# Patient Record
Sex: FEMALE | Race: WHITE
[De-identification: names, ages, dates, MRNs, and addresses within clinical notes are randomized per-mention and may not be internally consistent; named-entity substitution may affect disease eponyms.]

---

## 2019-12-03 ENCOUNTER — HOSPITAL ENCOUNTER (OUTPATIENT)
Dept: HOSPITAL 75 - PREOP | Age: 44
End: 2019-12-03
Attending: PODIATRIST
Payer: COMMERCIAL

## 2019-12-03 VITALS — BODY MASS INDEX: 44.81 KG/M2 | WEIGHT: 285.5 LBS | HEIGHT: 67.01 IN

## 2019-12-03 DIAGNOSIS — Z01.818: Primary | ICD-10-CM

## 2019-12-09 ENCOUNTER — HOSPITAL ENCOUNTER (OUTPATIENT)
Dept: HOSPITAL 75 - SDC | Age: 44
Discharge: HOME | End: 2019-12-09
Attending: PODIATRIST
Payer: COMMERCIAL

## 2019-12-09 VITALS — DIASTOLIC BLOOD PRESSURE: 90 MMHG | SYSTOLIC BLOOD PRESSURE: 133 MMHG

## 2019-12-09 VITALS — SYSTOLIC BLOOD PRESSURE: 140 MMHG | DIASTOLIC BLOOD PRESSURE: 91 MMHG

## 2019-12-09 VITALS — WEIGHT: 285.5 LBS | BODY MASS INDEX: 44.81 KG/M2 | HEIGHT: 66.93 IN

## 2019-12-09 VITALS — SYSTOLIC BLOOD PRESSURE: 121 MMHG | DIASTOLIC BLOOD PRESSURE: 80 MMHG

## 2019-12-09 VITALS — DIASTOLIC BLOOD PRESSURE: 95 MMHG | SYSTOLIC BLOOD PRESSURE: 138 MMHG

## 2019-12-09 VITALS — DIASTOLIC BLOOD PRESSURE: 92 MMHG | SYSTOLIC BLOOD PRESSURE: 137 MMHG

## 2019-12-09 VITALS — DIASTOLIC BLOOD PRESSURE: 83 MMHG | SYSTOLIC BLOOD PRESSURE: 119 MMHG

## 2019-12-09 VITALS — SYSTOLIC BLOOD PRESSURE: 141 MMHG | DIASTOLIC BLOOD PRESSURE: 96 MMHG

## 2019-12-09 VITALS — DIASTOLIC BLOOD PRESSURE: 72 MMHG | SYSTOLIC BLOOD PRESSURE: 113 MMHG

## 2019-12-09 VITALS — SYSTOLIC BLOOD PRESSURE: 142 MMHG | DIASTOLIC BLOOD PRESSURE: 90 MMHG

## 2019-12-09 DIAGNOSIS — Z90.710: ICD-10-CM

## 2019-12-09 DIAGNOSIS — E66.01: ICD-10-CM

## 2019-12-09 DIAGNOSIS — Z91.018: ICD-10-CM

## 2019-12-09 DIAGNOSIS — Z90.49: ICD-10-CM

## 2019-12-09 DIAGNOSIS — M20.12: Primary | ICD-10-CM

## 2019-12-09 DIAGNOSIS — T84.84XA: ICD-10-CM

## 2019-12-09 PROCEDURE — 87081 CULTURE SCREEN ONLY: CPT

## 2019-12-09 RX ADMIN — SODIUM CHLORIDE, SODIUM LACTATE, POTASSIUM CHLORIDE, AND CALCIUM CHLORIDE PRN MLS/HR: 600; 310; 30; 20 INJECTION, SOLUTION INTRAVENOUS at 07:06

## 2019-12-09 RX ADMIN — SODIUM CHLORIDE, SODIUM LACTATE, POTASSIUM CHLORIDE, AND CALCIUM CHLORIDE PRN MLS/HR: 600; 310; 30; 20 INJECTION, SOLUTION INTRAVENOUS at 10:09

## 2019-12-09 NOTE — PROGRESS NOTE-PRE OPERATIVE
Pre-Operative Progress Note


H&P Reviewed


The H&P was reviewed, patient examined and no changes noted.


Date Seen by Provider:  Dec 9, 2019


Time Seen by Provider:  07:23


Date H&P Reviewed:  Dec 9, 2019


Time H&P Reviewed:  07:23


Pre-Operative Diagnosis:  Hallux Valgus, left











AGUSTO DUFFY DPM               Dec 9, 2019 07:23


POS

## 2019-12-09 NOTE — OPERATIVE REPORT
DATE OF SERVICE:  12/09/2019



SURGEON:

Angelic Ness DPM.



PREOPERATIVE DIAGNOSES:

1.  Hallux abductovalgus metatarsus primus varus.

2.  Failed hardware.



POSTOPERATIVE DIAGNOSES:

1.  Hallux abductovalgus metatarsus primus varus.

2.  Failed hardware, left foot.



PROCEDURE:

1.  Modified Lapidus Akin bunionectomy, left.

2.  Removal of failed hardware, left.



WOUND CLASS:

Clean.



ANESTHESIA:

General.



HEMOSTASIS:

Pneumatic thigh tourniquet at 250 mmHg.



INDICATIONS:

This 44-year-old female presents complaining of painful left foot and bunion

deformity.  She has had a previous Lapidus bunionectomy.  The screw fixation

apparently has backed out and is causing some discomfort for the patient.  Also,

the great toe is drifting laterally and is overlapping and abutting the second

digit.  This is affecting her ambulation in comfortably wearing shoes.  She is

willing to proceed after risks and complications were discussed at length.  No

guarantees were extended to the patient and she is willing to proceed.



DESCRIPTION OF PROCEDURE:

The patient was brought back to the operating table, placed in secure supine

position.  General anesthetic was induced.  Appropriate timeout was performed. 

A pneumatic thigh tourniquet was placed on the left lower extremity over several

layers of padding.  Utilizing aseptic technique, 10 mL of 0.5% Marcaine was

injected in a Abdi block to the left foot.  The left foot was then prepped and

draped in normal sterile manner.  The left foot was then elevated, allowed to

exsanguinate after which the tourniquet was inflated to 250 mmHg.



Attention was then directed to the dorsal aspect of the left foot where along

the previous incision, a similar incision was created, which is approximately 10

cm in length.  The incision extended from the medial cuneiform to the

metatarsophalangeal joint and proximal phalanx area of the left first

metatarsophalangeal joint.  Dissection was carried out utilizing sharp and blunt

dissection.  The incision was deepened down to the capsular tissue of the first

metatarsophalangeal joint as well as to the medial aspect of the extensor

hallucis longus in the area of the hardware associated with the first metatarsal

medial cuneiform fusion area.  The incision was deepened down to the plate and

screws, which were removed.  The interfrag screw was very prominent and some

inflammatory tissue was noted in the area overlying this screw.  This is also in

the area of the patient's described discomfort.  The hardware was removed

completely.  Next, due to the fact that the first metatarsal remained in a

valgus orientation, a new Lapidus was performed and because it was a short first

metatarsal, it is advisable to utilize an allograft spacer.  First, a power

sagittal saw was utilized to create an opening at the first metatarsal medial

cuneiform joint area.  A second cut was performed to create more lateral 
deviation

and plantar flexion of the distal first metatarsal.  Utilizing a [place] 28 
Lapidus

system and allograft, an excellent reduction of the hallux valgus deformity was

appreciated.  First, there was a size 5 allograft Lapidus graft was placed and

confirmed by C-arm that it was appropriate reduction of the patient's deformity.

 Next, a standard 4-hole left Lapidus plate was utilized with a most proximal

screw of 3.5 mm diameter and 26 mm of length with locking screw placed.  Next,

an interfrag screw was applied.  It was a 3.5 mm, 38 mm in length from distal

dorsal to proximal plantar.  Good compression across the allograft and the

arthrodesis site was appreciated.  Next, a 3.5 locking screw of 16 mm of length

was applied to the remaining proximal hole of the locking of the plate.  The two

distal screws were 3.5 mm screws of 20 mm in length and 16 mm of length

respectively.  The last screw was a nonlocking screw.  Again, C-arm was utilized

to confirm appropriate alignment and there is a good reduction of the

dorsiflexion and valgus rotation that was noted preoperatively.  There was

improved sesamoid position as well as alignment of the first metatarsophalangeal

joint.



There was a good reduction of the lateral deviation of the hallux, but it was

then decided that an Nhan type procedure would assist in further improvement of

the alignment of the hallux.  Blunt dissection was carried out into the first

intermetatarsal space where a lateral release was performed.  A lateral

capsulorrhaphy was performed as well as release of the conjoint tendon of the

adductor hallucis and the fibular sesamoidal ligament.  Next, attention was

taken to remove the 28-gauge monofilament wire from the proximal phalanx.  Once

this was done, a revisional Nhan procedure was performed.  A sagittal saw was

utilized to create the osteotomy with the lateral cortices intact and the base

of the osteotomy was medial.  Once the gap was closed, this reduced the hallux

valgus deformity further.  Two  holes were created at the dorsal medial

aspect of the osteotomy after which a 28-gauge monofilament wire was then passed

through this  hole securing the osteotomy in a closed position.  Excellent

bony apposition and fixation was appreciated at this time.  The whole wound was

flushed with copious amounts of normal saline throughout the procedure.



Closure was then performed in layers.  Deep closure was performed with 3-0

Vicryl, superficial with 4-0 Vicryl, skin was closed with 4-0 Prolene in a

horizontal mattress type stitch.  Postoperative injection consisted of 12 mL of

0.5% Marcaine plain injected in a local infusion to the surgical site. 

Postoperative injection also included 10 mg dexamethasone injected into the

first intermetatarsal space, left foot.



Postoperative dressing consisted of Betadine soaked Adaptic, sterile 4 x 4,

sterile Kerlix all secured with a Coban wrap.



The patient tolerated the anesthesia and procedure well, was transported from

the operating room to the recovery area with vital signs stable and vascular

status intact to all digits of the left foot.  She was given a prescription for

Keflex and Vicodin.  She is to follow up in my office in 10 days' period of time

or sooner if necessary.





Job ID: [id number]

DocumentID: [id number]

Dictated Date:  12/09/2019 10:47:21

Transcription Date: 12/09/2019 18:25:18

Dictated By: WENDY ALONSO

## 2019-12-09 NOTE — ANESTHESIA-GENERAL POST-OP
General


Patient Condition


Mental Status/LOC:  Same as Preop


Cardiovascular:  Satisfactory


Nausea/Vomiting:  Absent


Respiratory:  Satisfactory


Pain:  Controlled


Complications:  Absent





Post Op Complications


Complications


None





Follow Up Care/Instructions


Patient Instructions


None needed.





Anesthesia/Patient Condition


Patient Condition


Patient is doing well, no complaints, stable vital signs, no apparent adverse 

anesthesia problems.   


No complications reported per nursing.











JACY CURTIS CRNA               Dec 9, 2019 13:17


POS

## 2019-12-09 NOTE — PROGRESS NOTE-POST OPERATIVE
Post-Operative Progess Note


Surgeon (s)/Assistant (s)


Surgeon


AGUSTO DUFFY DPM


Assistant:  none





Pre-Operative Diagnosis


Hallux Valgus, left





Post-Operative Diagnosis





same, plus failed hardware





Procedure & Operative Findings


Date of Procedure


12/9/19


Procedure Performed/Findings


Lapidus-akin bunionectomy, Removal of hardware, left foot


Anesthesia Type


General





Estimated Blood Loss


Estimated blood loss (mL):  minimal





Specimens/Packing


Specimens Removed


hardware from previous bunionectomy











AGUSTO DUFFY DPM               Dec 9, 2019 10:30


POS

## 2019-12-09 NOTE — PHYSICAL THERAPY PROGRESS NOTE
Therapy Progress Note


Patient declined PT secondary to multiple foot surgeries prior and has 

established knee scooter and other AD for home use.  Thank you for this 

referral.


1 ref ([id number])











BRENDAN ADHIKARI PT               Dec 9, 2019 12:18


POS

## 2019-12-09 NOTE — DIAGNOSTIC IMAGING REPORT
INDICATION: Fluoroscopy utilized in surgery by Dr. Ness for

hardware removal.



FINDINGS: No previous films for review. There are holes in the

1st metatarsal shaft consistent with previous hardware. There is

sideplate present with bone screws along the 1st metatarsal

tarsal joint which appear intact.



IMPRESSION: Intraoperative film showing arthrodesis of the 1st MP

joint. Hardware present as described.



Dictated by: 



  Dictated on workstation # [id number]

## 2020-01-03 NOTE — XMS REPORT
MU2 Ambulatory Summary

                             Created on: 2019



Elba Bradley

External Reference #: [id number]

: 1975

Sex: Female



Demographics





                          Address                   [address]

[place]  [address]

 

                          Home Phone                [phone]

 

                          Preferred Language        English

 

                          Marital Status            

 

                          Orthodox Affiliation     Unknown

 

                          Race                      Other Race

 

                          Ethnic Group              Not  or 





Author





                          Author                    Elba Walls

 

                          [facility] 

[facility]

 

                          Address                   [address]

[place], [place]  [id number]



 

                          Phone                     [phone]







Care Team Providers





                    Care Team Member Name Role                Phone

 

                    Moisés Walls         PCP                 [phone]

 

                    Amor Harper PreferredProvider   [phone]







Allergies and Adverse Reactions





                    Name                Reaction            Notes

 

                      NO KNOWN DRUG ALLERGIES                      







Plan of Treatment





             Planned Activity Comments     Planned Date Planned Time Plan/Goal

 

             MRI CERVICAL SPINE W/O CONTRAST              2019    12:00 AM 

     

 

             Lumbar Spine 2-3 Views - Main              2019    12:00 AM   

   







Medications





                                        Active 

 

             Name         Start Date   Estimated Completion Date SIG          Co

mments

 

             Mucinex D  mg oral tablet extended release 12 hr             

                            

 

             Advil Cold and Sinus oral                                         

 

             albuterol sulfate inhalation                                       

  

 

                ipratropium bromide 0.02 % inhalation solution 2019         

               inhale 1.25 

milliliters (250 mcg) via nebulizer by inhalation route 3 times per day  

 

                diclofenac sodium 75 mg oral tablet,delayed release (DR/EC) 4/10

/2019       5/10/2019       

take 1 tablet (75 mg) by oral route 2 times per day for 30 days  







                                         

 

             Name         Start Date   Expiration Date SIG          Comments

 

                Mirena Intrauterine IUD 20 mcg/24 hour (5 years)                

                 place 1 device by 

intrauterine route                       

 

             doxycycline hyclate Oral tablet 20 mg                           [place]

e 1 tablet by oral route daily  

 

                levofloxacin 500 mg oral tablet 2019       

take 1 tablet (500 mg) by 

oral route once daily for 10 days        







                                        Discontinued 

 

             Name         Start Date   Discontinued Date SIG          Comments

 

                doxycycline hyclate Oral capsule 100 mg                 

3       take 1 capsule (100 mg) by 

oral route once daily                    







Problem List

Not available.



Vital Signs





     Date Time BP-Sys(mm[Hg] BP-Tami(mm[Hg]) HR(bpm) RR(rpm) Temp WT   HT   HC   

BMI  BSA  BMI

 Percentile                             O2 Sat(%)

 

       2019 6:28:00  mmHg 102 mmHg 101 bpm 24 rpm 98.2 F 181.5 lbs 67.

25 in  

                28.2157 kg/m  1.9764 m                      97 %

 

       2019 4:41:00  mmHg 72 mmHg 68 bpm 16 rpm 97.7 F 188.125 lbs 67.

25 in  

                29.25 kg/m2     2.01 m2                         98 %

 

      2013 8:46:00  mmHg 92 mmHg 72 bpm       97.6 F 263.25 lbs 67 in

       41.2304

 kg/m             2.3759 m                               

 

      2013 9:49:00  mmHg 98 mmHg 90 bpm       97 F  266.25 lbs 67 in 

      41.70 

kg/m2               2.39 m2                                  

 

      2012 9:16:00  mmHg 90 mmHg 66 bpm       97.2 F 264 lbs 67 in  

     41.3478 

kg/m              2.3792 m                               







Social History





                    Name                Description         Comments

 

                    Tobacco             Never smoker         

 

                    Alcohol             Never                







History of Procedures





                    Date Ordered        Description         Order Status

 

                    2012 12:00 AM CYTOPATH C/V MANUAL Reviewed

 

                    2012 12:00 AM SPECIMEN HANDLING OFFICE-LAB Reviewed

 

                    2012 12:00 AM CHLAMYDIA CULTURE   Reviewed

 

                    2012 12:00 AM N.GONORRHOEAE DNA AMP PROB Reviewed

 

                    2013 12:00 AM  URINE PREGNANCY TEST Reviewed

 

                    2013 12:00 AM  INSERT INTRAUTERINE DEVICE Reviewed

 

                    2013 12:00 AM  Mirena              Reviewed

 

                    2019 12:00 AM   FLU VAC NO PRSV 4 TAWANDA 3 YRS+ Reviewed

 

                    2019 12:00 AM   THER/PROPH/DIAG INJ SC/IM Reviewed

 

                    2019 12:00 AM   Decadron 8mg Injection Reviewed

 

                    2019 12:00 AM   Depo-Medrol 80mg Injection Reviewed

 

                    2019 12:00 AM   THER/PROPH/DIAG INJ SC/IM Reviewed







Results Summary

Not available.



History Of Immunizations





      Name  Date Admin Mfg Name Mfg Code Trade Name Lot#  Route Inj   Vis Given 

Vis Pub 

CVX

 

        Influenza 10/4/2017 Not Entered NE      Not Entered         Not Entered 

Not Entered 

1                  158

 

          Influenza 2019  [employer] [place] or [place] BCQ       Flulaval qu

adrivalent 3PM59     

Intramuscular   Right Deltoid   2019        158







History of Past Illness





                    Name                Date of Onset       Comments

 

                    Rosacea                                  

 

                    Chicken pox                              

 

                    Allergic rhinitis                        

 

                    Bunion of left foot                      

 

                    Bunion of right foot                      

 

                    Plantar fasciitis                        

 

                    Asthma                                   

 

                    Reactive hypoglycemia                      

 

                    Routine gynecological examination Dec 19 2012  9:18AM  

 

                                        Special investigations and examinations;

 pregnancy examination or test; 

pregnancy examination or test, negative result 2013 10:00AM        

 

                    IUD insertion       2013  9:52AM  

 

                    IUD Check/Removal/Management/Reinsertion Aug 28 2013  8:54AM

  

 

                    Flu vaccine need    2019  4:56PM  

 

                    Neck pain           2019  4:56PM  

 

                    Back pain           2019  4:56PM  

 

                    Knee pain           2019  4:56PM  

 

                    Bunion of great toe of left foot 2019  4:56PM  

 

                    Vitamin D deficiency 2019  4:56PM  

 

                    History of bariatric surgery 2019  4:56PM  

 

                    Bronchitis, Acute   2019  6:33PM  

 

                    Sinusitis, Acute    2019  6:33PM  

 

                    Cervicalgia         2019 12:25PM  

 

                    Other chronic pain  2019 12:25PM  

 

                    Dorsalgia, unspecified 2019 12:25PM  

 

                    Other chronic pain  2019 12:25PM  







Payers





           Insurance Name Company Name Plan Name  Plan Number Policy Number Slim

cy Group 

Number                                  Start Date

 

                    [employer]      [employer] [facility]           [id number]           N/

A

 

                    [employer]      [employer] [facility]           [id number]           N/

A







History of Encounters





                    Visit Date          Visit Type          Provider

 

                    2019            Office visit        Moisés Walls NP

 

                    2019            Office visit        Amor Harper 

APRN

 

                    2013           Office visit        Pritesh Shoemaker MD

 

                    2013           Office visit        Pritesh Shoemaker MD

 

                    2012          Office visit        Pritesh Shoemaker MD

## 2020-01-03 NOTE — XMS REPORT
MU2 Ambulatory Summary

                             Created on: 2019



Elba Bradley

External Reference #: [id number]

: 1975

Sex: Female



Demographics





                          Address                   [address]

[place]  [address]

 

                          Home Phone                [phone]

 

                          Preferred Language        English

 

                          Marital Status            

 

                          Religion Affiliation     Unknown

 

                          Race                      Other Race

 

                          Ethnic Group              Not  or 





Author





                          Elba Malloy

 

                          [facility] 

[facility]

 

                          Address                   [address]

[place], [place]  [address]



 

                          Phone                     [phone]







Care Team Providers





                    Care Team Member Name Role                Phone

 

                    Brenda Lora PCP                 [phone]

 

                    Amor Harper PreferredProvider   [phone]







Allergies and Adverse Reactions





                    Name                Reaction            Notes

 

                      NO KNOWN DRUG ALLERGIES                      







Plan of Treatment





             Planned Activity Comments     Planned Date Planned Time Plan/Goal

 

             Breast ultrasound              2019     12:00 AM      

 

             Unilateral Diagnostic Mammo with Tomosynthesis              

9     12:00 AM      







Medications





                                        Active 

 

             Name         Start Date   Estimated Completion Date SIG          Co

mments

 

             Mucinex D  mg oral tablet extended release 12 hr             

                            

 

             Advil Cold and Sinus oral                                         

 

             albuterol sulfate inhalation                                       

  

 

                ipratropium bromide 0.02 % inhalation solution 2019         

               inhale 1.25 

milliliters (250 mcg) via nebulizer by inhalation route 3 times per day  

 

                cholecalciferol (vitamin D3) 50,000 unit oral capsule 5/10/2019 

                      take 1 capsule

by oral route once  weekly               







                                         

 

             Name         Start Date   Expiration Date SIG          Comments

 

                Mirena Intrauterine IUD 20 mcg/24 hour (5 years)                

                 place 1 device by 

intrauterine route                       

 

             doxycycline hyclate Oral tablet 20 mg                           kayleigh

e 1 tablet by oral route daily  

 

                levofloxacin 500 mg oral tablet 2019       

take 1 tablet (500 mg) by 

oral route once daily for 10 days        

 

                diclofenac sodium 75 mg oral tablet,delayed release (DR/EC) 4/10

/2019       5/10/2019       

take 1 tablet (75 mg) by oral route 2 times per day for 30 days  







                                        Discontinued 

 

             Name         Start Date   Discontinued Date SIG          Comments

 

                doxycycline hyclate Oral capsule 100 mg                 

3       take 1 capsule (100 mg) by 

oral route once daily                    







Problem List

Not available.



Vital Signs





     Date Time BP-Sys(mm[Hg] BP-Tami(mm[Hg]) HR(bpm) RR(rpm) Temp WT   HT   HC   

BMI  BSA  BMI

 Percentile                             O2 Sat(%)

 

      2019 9:27:00  mmHg 80 mmHg 65 bpm       98.4 F 187 lbs 67.25 in

       29.0707

 kg/m             2.0062 m                               

 

       2019 6:28:00  mmHg 102 mmHg 101 bpm 24 rpm 98.2 F 181.5 lbs 67.

25 in  

                28.22 kg/m2     1.98 m2                         97 %

 

       2019 4:41:00  mmHg 72 mmHg 68 bpm 16 rpm 97.7 F 188.125 lbs 67.

25 in  

                29.25 kg/m2     2.01 m2                         98 %

 

      2013 8:46:00  mmHg 92 mmHg 72 bpm       97.6 F 263.25 lbs 67 in

       41.2304

 kg/m             2.3759 m                               

 

      2013 9:49:00  mmHg 98 mmHg 90 bpm       97 F  266.25 lbs 67 in 

      41.70 

kg/m2               2.39 m2                                  

 

      2012 9:16:00  mmHg 90 mmHg 66 bpm       97.2 F 264 lbs 67 in  

     41.3478 

kg/m              2.3792 m                               







Social History





                    Name                Description         Comments

 

                    Tobacco             Never smoker         

 

                    Alcohol             Never                







History of Procedures





                    Date Ordered        Description         Order Status

 

                    2012 12:00 AM CYTOPATH C/V MANUAL Reviewed

 

                    2012 12:00 AM SPECIMEN HANDLING OFFICE-LAB Reviewed

 

                    2012 12:00 AM CHLAMYDIA CULTURE   Reviewed

 

                    2012 12:00 AM N.GONORRHOEAE DNA AMP PROB Reviewed

 

                    2013 12:00 AM  URINE PREGNANCY TEST Reviewed

 

                    2013 12:00 AM  INSERT INTRAUTERINE DEVICE Reviewed

 

                    2013 12:00 AM  Mirena              Reviewed

 

                    2019 12:00 AM   FLU VAC NO PRSV 4 TAWANDA 3 YRS+ Reviewed

 

                    2019 12:00 AM   THER/PROPH/DIAG INJ SC/IM Reviewed

 

                    2019 12:00 AM   Decadron 8mg Injection Reviewed

 

                    2019 12:00 AM   Depo-Medrol 80mg Injection Reviewed

 

                    2019 12:00 AM   THER/PROPH/DIAG INJ SC/IM Reviewed

 

                    2019 12:00 AM  MRI NECK SPINE W/O DYE Reviewed

 

                    2019 12:00 AM  X-RAY EXAM L-S SPINE 2/3 VWS Reviewed

 

                    2019 12:00 AM   ROUTINE VENIPUNCTURE Reviewed

 

                    2019 12:00 AM   COMPREHEN METABOLIC PANEL Reviewed

 

                    2019 12:00 AM   GLYCOSYLATED HEMOGLOBIN TEST Reviewed

 

                    2019 12:00 AM   VITAMIN D 25 HYDROXY Reviewed

 

                    2019 12:00 AM   ASSAY THYROID STIM HORMONE Reviewed

 

                    2019 12:00 AM   LIPID PANEL         Reviewed

 

                    2019 12:00 AM  Screening mammography, bilateral Reviewe

d

 

                    2019 12:00 AM  MAMMOGRAM BOTH BREASTS Reviewed







Results Summary





                          Date and Description      Results

 

                          2019 8:25 AM          GLUCOSE 86 SODIUM 140 POTASS

IUM 4.3 CHLORIDE 108 CO2 26 BUN 13 

CREATININE 0.73 SGOT/AST 15 SGPT/ALT 8 ALK PHOS 52 TOTAL PROTEIN 6.1 ALBUMIN 4.0
 TOTAL BILI 0.8 CALCIUM 9.4 AGE 43 GFR NonAA 86 GFR  eGFR 86 eGFR AA* >60 
TRIGLYCERIDES 102 CHOLESTEROL 204 HDL 54 TOT CHOL/HDL 3.8 LDL (CALC) 130 VITAMIN
 D 17.7 TSH 0.98 HGB A1C 5.10 %Est Avg Glucose 99.7 







History Of Immunizations





      Name  Date Admin Mfg Name Mfg Code Trade Name Lot#  Route Inj   Vis Given 

Vis Pub 

CVX

 

        Influenza 10/4/2017 Not Entered NE      Not Entered         Not Entered 

Not Entered 

1                  158

 

          Influenza 2019  ID [place] or [place] BCQ       Flulaval qu

adrivalent 3PM59     

Intramuscular   Right Deltoid   2019        158







History of Past Illness





                    Name                Date of Onset       Comments

 

                    Rosacea                                  

 

                    Chicken pox                              

 

                    Allergic rhinitis                        

 

                    Bunion of left foot                      

 

                    Bunion of right foot                      

 

                    Plantar fasciitis                        

 

                    Asthma                                   

 

                    Reactive hypoglycemia                      

 

                    Routine gynecological examination Dec 19 2012  9:18AM  

 

                                        Special investigations and examinations;

 pregnancy examination or test; 

pregnancy examination or test, negative result 2013 10:00AM        

 

                    IUD insertion       2013  9:52AM  

 

                    IUD Check/Removal/Management/Reinsertion Aug 28 2013  8:54AM

  

 

                    Flu vaccine need    2019  4:56PM  

 

                    Neck pain           2019  4:56PM  

 

                    Back pain           2019  4:56PM  

 

                    Knee pain           2019  4:56PM  

 

                    Bunion of great toe of left foot 2019  4:56PM  

 

                    Vitamin D deficiency 2019  4:56PM  

 

                    History of bariatric surgery 2019  4:56PM  

 

                    Bronchitis, Acute   2019  6:33PM  

 

                    Sinusitis, Acute    2019  6:33PM  

 

                    Cervicalgia         2019 12:25PM  

 

                    Other chronic pain  2019 12:25PM  

 

                    Dorsalgia, unspecified 2019 12:25PM  

 

                    Other chronic pain  2019 12:25PM  

 

                    Screening for ischemic heart disease May  8 2019 10:52AM  

 

                    Myalgia             May  8 2019 10:52AM  

 

                    Reactive hypoglycemia May  8 2019 10:52AM  

 

                    History of bariatric surgery May  8 2019 10:52AM  

 

                    Encounter for screening mammogram for breast cancer May 13 2

019  1:28PM  

 

                    Encntr for gyn exam (general) (routine) w/o abn findings May

 13 2019  9:33AM  

 

                          Special screening for malignant neoplasms; breast; oth

er screening mammogram May

 13 2019  9:33AM                         

 

                    Abnormal Mammogram  2019  4:15PM  







Payers





           Insurance Name Company Name Plan Name  Plan Number Policy Number Slim

cy Group 

Number                                  Start Date

 

                    [employer]      [employer] [place]           [id number]           N/

A

 

                    [employer]      [employer]           [id number]           N/

A







History of Encounters





                    Visit Date          Visit Type          Provider

 

                    2019           Office visit         

 

                    2019           Office visit        Brenda melendrez APRN

 

                    2019            Laboratory          Amor Harper 

APRN

 

                    2019            Office visit        Moisés Walls NP

 

                    2019            Office visit        Amor Harper 

APRN

 

                    2013           Office visit        Pritesh Shoemaker MD

 

                    2013           Office visit        Pritesh Shoemaker MD

 

                    2012          Office visit        Pritesh Shoemaker MD

## 2020-01-03 NOTE — XMS REPORT
[facility]

                             Created on: 2018



Elba Bradley

External Reference #: [id number]

: 1975

Sex: Female



Demographics





                          Address                   [address]

[place]  [address]

 

                          Preferred Language        Unknown

 

                          Marital Status            Unknown

 

                          Orthodoxy Affiliation     Unknown

 

                          Race                      Unknown

 

                          Ethnic Group              Unknown





Author





                          Author                    Elba Diego

 

                          Organization              [facility]

 

                          Address                   

[place]  [address]



 

                          Phone                     [phone]







Care Team Providers





                    Care Team Member Name Role                Phone

 

                    Soraida Diego    Unavailable         [phone]







PROBLEMS





          Type      Condition ICD9-CM Code FVU89-QI Code Onset Dates Condition S

tatus SNOMED 

Code

 

          Problem   Conjunctivitis, rosacea 372.31                        Active

    65782688

 

          Problem   Bunion of great toe of left foot 727.1                      

   Active    996184124

 

          Problem   Vitamin D deficiency 268.9                         Active   

 02080721

 

          Problem   Obesity (BMI 30.0-34.9)           E66.9               Active

    [id number]

 

          Problem   General Medical Exam Adult           Z00.00              Act

neva    [id number]

 

          Problem   Gynecological Exam Normal           Z01.419             Acti

ve    [id number]

 

          Problem   Hypertriglyceridemia           E78.1               Active   

 768506847

 

          Problem   Screening For Breast Cancer NOT MAMMOGRAM           Z12.39  

            Active    600418880

 

          Problem   Vitamin D deficiency, unspecified           E55.9           

    Active    37496004

 

          Problem   Morbid obesity with BMI of 45.0-49.9, adult 278.01          

              Active    091306827

 

          Problem   Allergic Rhinitis Unspecified 477.9                         

Active    93923640

 

          Problem   Back Pain, Low 724.2                         Active    72759

9007

 

          Problem   Rosacea   695.3                         Active    095689288

 

          Problem   Hyperhidrosis, Primary Focal 705.21                        A

ctive    144129403

 

          Problem   Hypertriglyceridemia 272.1                         Active   

 968754735







ALLERGIES

No Information



ENCOUNTERS





                Encounter       Location        Date            Diagnosis

 

                          [facility] [address] [facility] [address]  [place] 

[address]                08 May, 2018              Wellness Examination Adult Z

00.00 ; Screening For 

Cervical Cancer Z12.4 ; Vitamin D deficiency, unspecified E55.9 ; Screening For 
Breast Cancer Z12.31 and Status Post Bariatric Surgery Z98.84

 

                          [facility] [address] [facility] [address]  [place] 

[address]                01 May, 2018              Overweight E66.3 and Body ma

ss index (BMI) 27.0-27.9, 

adult Z68.27

 

                          [facility] [address] [facility] [address] 

[facility]  [place] 

[address]                01 May, 2018               

 

                          [facility] [address] [facility] [address]  [place] 

[address]                04 Oct, 2017              Overweight E66.3 ; Vitamin D

 deficiency, unspecified 

E55.9 and Vaccine Flu Z23

 

                          [facility] [address] [facility] [address] 

[facility] [place] 

[address]                29 Sep, 2017              Obesity (BMI 30.0-34.9) E66.

9

 

                          [facility] [address] [facility] [address] 

[facility] [place] 

[address]                              Obesity (BMI 30.0-34.9) E66.

9

 

                          [facility] [address] [facility] [address] 

[facility] [place] 

[address]                10 May, 2017              General Medical Exam Adult Z

00.00 ; Screening For Breast

Cancer NOT MAMMOGRAM Z12.39 ; Vitamin D deficiency, unspecified E55.9 and 
Gynecological Exam Normal Z01.419

 

                          [facility] [address] [facility] [address] 

[facility] [place] 

[address]                14 2016              Exam Gynecological Routine V

72.31 ; Exam General Adult 

Medical V70.0 ; Mammogram, Routine V76.12 ; Hypertriglyceridemia E78.1 ; Exam 
Gynecological Without Abnormal Findings Z01.419 and Exam General Adult Without 
Abnormal Findings Z00.00

 

                          [facility] [address] [facility] [address] 

[facility] [place] 

[address]                               

 

                          [facility] [address] [facility] [address]  [place] 

[address]                28 Aug, 2015              Vitamin D deficiency 268.9

 

                          [facility] [address] [facility] [address] 

[facility] [place] 

[address]                26 Aug, 2015              Vitamin D deficiency 268.9

 

                          [facility] [address] [facility] [address]  [place] 

[address]                              Mammogram, Routine V76.12

 

                          [facility] [address] [facility] [address] 

[facility] [place] 

[address]                10 Miky, 2015              Vitamin D deficiency 268.9

 

                          [facility] [address] 

[address]                08 2015              Hypertriglyceridemia 272.1 a

nd Nondisplaced fracture of 

first metatarsal bone with delayed healing V54.19

 

                          [facility] [address] 

[address]                05 2015              Ocular rosacea 695.3 and Non

displaced fracture of first 

metatarsal bone with delayed healing V54.19

 

                          [facility] [address] 

[address]                26 May, 2015               

 

                          [facility] [address] 

[address]                23 Mar, 2015              Lip lesion 528.5

 

                          [facility] [address] 

[address]                23 Mar, 2015               

 

                          [facility] [address] 

[address]                18 Mar, 2015              Pre-op evaluation V72.84 ; H

ypertriglyceridemia 272.1 ; 

Bunion of great toe of left foot 727.1 and Conjunctivitis, rosacea 372.31

 

                Refills         UNKNOWN         13 Mar, 2015     

 

                          [facility] [address] 

[address]                09 Mar, 2015              Conjunctivitis, rosacea 372.

31

 

                          [facility] [address] 

[address]                              Hypertriglyceridemia 272.1 ;

 Conjunctivitis, rosacea 

372.31 and Rosacea 695.3

 

                          [facility] [address] 

[address]                18 2015              Well woman exam with routine

 gynecological exam V72.31 

and Exam General Adult Medical V70.0

 

                          [facility] [address] 

[address]                              Hyperhidrosis, Primary Focal

 705.21

 

                          [facility] [address] 

[address]                09 Sep, 2014              Hyperhidrosis, Primary Focal

 705.21

 

                          [facility] [address] 

[address]                15 Aug, 2014              Low back pain 724.2 ; SOMAT 

DYSFUNC LUMBAR .3 ; 

SOMAT DYSFUNC SACRAL .4 and SOMAT DYSFUNC THORAC .2

 

                          [facility] [address] 

[address]                              Obesity, morbid: BMI 40 or g

reater 278.01 ; Plantar 

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 
Pressure w/o DX Hypertension 796.2

 

                          [facility] [address] [place] [address] 

[address]                               

 

                          [facility] [address] [place] [address] 

[address]                              Neck Pain 723.1 ; SOMAT DYSF

UNC CERVIC .1 and 

SOMAT DYSFUNC THORAC .2

 

                          [facility] [address] [place] [address] 

[address]                12 May, 2014              Obesity: BMI 30-39.9 278.00

 

                          [facility] [address] [place] [address] 

[address]                              Obesity: BMI 30-39.9 278.00

 

                          [facility] [address] [place] [address] 

[address]                07 Mar, 2014              Obesity: BMI 30-39.9 278.00

 

                          [facility] [address] [place] [address] 

[address]                              Exam General Adult Medical V

70.0 ; Exam Laboratory part 

of Medical Exam V72.62 and Vaccine, DTP or DTaP V06.1

 

                          [facility] [address] [place] [address] 

[address]                              Exam Gynecological Routine V

72.31 ; Exam General Adult 

Medical V70.0 ; Obesity: BMI 30-39.9 278.00 and Mammogram, Routine V76.12

 

                          [facility] [address] [place] [address] 

[address]                15 Jorden, 2014               

 

                          [facility] [address] [facility] [address]  [place] 

[address]                              Backache, Unspecified 724.5 

; SOMAT DYSFUNC THORAC REG 

739.2 and SOMAT DYSFUNC PELVIC .5

 

                          [facility] [address] [facility] [address]  [place] 

[address]                09 Dec, 2013              Back Pain, Low 724.2 and Obe

sity/overweight 278.00







IMMUNIZATIONS

No Known Immunizations



SOCIAL HISTORY

Never Assessed



REASON FOR VISIT

[facility] Demographics



PLAN OF CARE





VITAL SIGNS





MEDICATIONS

No Known Medications



RESULTS

No Results



PROCEDURES

No Known procedures



INSTRUCTIONS





MEDICATIONS ADMINISTERED

No Known Medications



MEDICAL (GENERAL) HISTORY





                    Type                Description         Date

 

                    Surgical History    right knee arthoscopy 

 

                    Surgical History    D&C x2 w/ miscarriages  

 

                    Surgical History    foot surgery   x2 Right foot 2014

 

                    Surgical History    stomach sleeve      2015

 

                    Surgical History    Hystorectomy        2016

 

                    Surgical History    Gallbladder         Aug 2017

 

                    Hospitalization History pre-eclampsia       2005

## 2020-01-03 NOTE — XMS REPORT
MU2 Ambulatory Summary

                             Created on: 2019



Elba Bradley

External Reference #: [id number]

: 1975

Sex: Female



Demographics





                          Address                   [address]

[place]  [address]

 

                          Home Phone                [phone]

 

                          Preferred Language        English

 

                          Marital Status            

 

                          Restoration Affiliation     Unknown

 

                          Race                      Other Race

 

                          Ethnic Group              Not  or 





Author





                          Author                    Elba Harper

 

                          [facility] 

oup

 

                          Address                   [address]

[place], [place]  [id number]



 

                          Phone                     [phone]







Care Team Providers





                    Care Team Member Name Role                Phone

 

                    Amor Harper PCP                 [phone]

 

                    Amor Harper PreferredProvider   [phone]







Allergies and Adverse Reactions





                    Name                Reaction            Notes

 

                      NO KNOWN DRUG ALLERGIES                      







Plan of Treatment





             Planned Activity Comments     Planned Date Planned Time Plan/Goal

 

             MRI CERVICAL SPINE W/O CONTRAST              2019    12:00 AM 

     

 

             CMP                       2019     12:00 AM      

 

             HGB A1C                   2019     12:00 AM      

 

             VITAMIN D (25 HYDROXY)              2019     12:00 AM      

 

             TSH                       2019     12:00 AM      

 

             LIPID PANEL               2019     12:00 AM      







Medications





                                        Active 

 

             Name         Start Date   Estimated Completion Date SIG          Co

mments

 

             Mucinex D  mg oral tablet extended release 12 hr             

                            

 

             Advil Cold and Sinus oral                                         

 

             albuterol sulfate inhalation                                       

  

 

                ipratropium bromide 0.02 % inhalation solution 2019         

               inhale 1.25 

milliliters (250 mcg) via nebulizer by inhalation route 3 times per day  

 

                diclofenac sodium 75 mg oral tablet,delayed release (DR/EC) 4/10

/2019       5/10/2019       

take 1 tablet (75 mg) by oral route 2 times per day for 30 days  







                                         

 

             Name         Start Date   Expiration Date SIG          Comments

 

                Mirena Intrauterine IUD 20 mcg/24 hour (5 years)                

                 place 1 device by 

intrauterine route                       

 

             doxycycline hyclate Oral tablet 20 mg                           [place]

e 1 tablet by oral route daily  

 

                levofloxacin 500 mg oral tablet 2019       

take 1 tablet (500 mg) by 

oral route once daily for 10 days        







                                        Discontinued 

 

             Name         Start Date   Discontinued Date SIG          Comments

 

                doxycycline hyclate Oral capsule 100 mg                 

3       take 1 capsule (100 mg) by 

oral route once daily                    







Problem List

Not available.



Vital Signs





     Date Time BP-Sys(mm[Hg] BP-Tami(mm[Hg]) HR(bpm) RR(rpm) Temp WT   HT   HC   

BMI  BSA  BMI

 Percentile                             O2 Sat(%)

 

       2019 6:28:00  mmHg 102 mmHg 101 bpm 24 rpm 98.2 F 181.5 lbs 67.

25 in  

                28.2157 kg/m  1.9764 m                      97 %

 

       2019 4:41:00  mmHg 72 mmHg 68 bpm 16 rpm 97.7 F 188.125 lbs 67.

25 in  

                29.25 kg/m2     2.01 m2                         98 %

 

      2013 8:46:00  mmHg 92 mmHg 72 bpm       97.6 F 263.25 lbs 67 in

       41.2304

 kg/m             2.3759 m                               

 

      2013 9:49:00  mmHg 98 mmHg 90 bpm       97 F  266.25 lbs 67 in 

      41.70 

kg/m2               2.39 m2                                  

 

      2012 9:16:00  mmHg 90 mmHg 66 bpm       97.2 F 264 lbs 67 in  

     41.3478 

kg/m              2.3792 m                               







Social History





                    Name                Description         Comments

 

                    Tobacco             Never smoker         

 

                    Alcohol             Never                







History of Procedures





                    Date Ordered        Description         Order Status

 

                    2012 12:00 AM CYTOPATH C/V MANUAL Reviewed

 

                    2012 12:00 AM SPECIMEN HANDLING OFFICE-LAB Reviewed

 

                    2012 12:00 AM CHLAMYDIA CULTURE   Reviewed

 

                    2012 12:00 AM N.GONORRHOEAE DNA AMP PROB Reviewed

 

                    2013 12:00 AM  URINE PREGNANCY TEST Reviewed

 

                    2013 12:00 AM  INSERT INTRAUTERINE DEVICE Reviewed

 

                    2013 12:00 AM  Mirena              Reviewed

 

                    2019 12:00 AM   FLU VAC NO PRSV 4 TAWANDA 3 YRS+ Reviewed

 

                    2019 12:00 AM   THER/PROPH/DIAG INJ SC/IM Reviewed

 

                    2019 12:00 AM   Decadron 8mg Injection Reviewed

 

                    2019 12:00 AM   Depo-Medrol 80mg Injection Reviewed

 

                    2019 12:00 AM   THER/PROPH/DIAG INJ SC/IM Reviewed

 

                    2019 12:00 AM  X-RAY EXAM L-S SPINE 2/3 VWS Returned

 

                    2019 12:00 AM   ROUTINE VENIPUNCTURE Reviewed







Results Summary

Not available.



History Of Immunizations





      Name  Date Admin Mfg Name Mfg Code Trade Name Lot#  Route Inj   Vis Given 

Vis Pub 

CVX

 

        Influenza 10/4/2017 Not Entered NE      Not Entered         Not Entered 

Not Entered 

1                  158

 

          Influenza 2019  ID [employer]gr [employer] [place] or [place] BCQ       Flulaval qu

adrivalent 3PM59     

Intramuscular   Right Deltoid   2019        158







History of Past Illness





                    Name                Date of Onset       Comments

 

                    Rosacea                                  

 

                    Chicken pox                              

 

                    Allergic rhinitis                        

 

                    Bunion of left foot                      

 

                    Bunion of right foot                      

 

                    Plantar fasciitis                        

 

                    Asthma                                   

 

                    Reactive hypoglycemia                      

 

                    Routine gynecological examination Dec 19 2012  9:18AM  

 

                                        Special investigations and examinations;

 pregnancy examination or test; 

pregnancy examination or test, negative result 2013 10:00AM        

 

                    IUD insertion       2013  9:52AM  

 

                    IUD Check/Removal/Management/Reinsertion Aug 28 2013  8:54AM

  

 

                    Flu vaccine need    2019  4:56PM  

 

                    Neck pain           2019  4:56PM  

 

                    Back pain           2019  4:56PM  

 

                    Knee pain           2019  4:56PM  

 

                    Bunion of great toe of left foot 2019  4:56PM  

 

                    Vitamin D deficiency 2019  4:56PM  

 

                    History of bariatric surgery 2019  4:56PM  

 

                    Bronchitis, Acute   2019  6:33PM  

 

                    Sinusitis, Acute    2019  6:33PM  

 

                    Cervicalgia         2019 12:25PM  

 

                    Other chronic pain  2019 12:25PM  

 

                    Dorsalgia, unspecified 2019 12:25PM  

 

                    Other chronic pain  2019 12:25PM  

 

                    Screening for ischemic heart disease May  8 2019 10:52AM  

 

                    Myalgia             May  8 2019 10:52AM  

 

                    Reactive hypoglycemia May  8 2019 10:52AM  

 

                    History of bariatric surgery May  8 2019 10:52AM  







Payers





           Insurance Name Company Name Plan Name  Plan Number Policy Number Slim

 Group 

Number                                  Start Date

 

                    BCBS      [employer] [facility]           [id number]           N/

A

 

                    [employer]BS      [employer] [facility]           [id number]           N/

A







History of Encounters





                    Visit Date          Visit Type          Provider

 

                    2019            Laboratory          Amor Harper 

APRN

 

                    2019            Office visit        Moisés Walls NP

 

                    2019            Office visit        Amor Harper 

APRN

 

                    2013           Office visit        Pritesh Shoemaker MD

 

                    2013           Office visit        Pritesh Shoemaker MD

 

                    2012          Office visit        Pritesh Shoemaker MD

## 2020-01-03 NOTE — XMS REPORT
[facility] [employer]

                             Created on: 2017



Elba Bradley

External Reference #: [id number]

: 1975

Sex: Female



Demographics





                          Address                   [address]

[place]  [address]

 

                          Preferred Language        Unknown

 

                          Marital Status            Unknown

 

                          Pentecostalism Affiliation     Unknown

 

                          Race                      Unknown

 

                          Ethnic Group              Unknown





Author





                          Author                    Elba Diego

 

                          Organization              [email or web address]

 

                          Address                   

[place]  [address]



 

                          Phone                     [phone]







Care Team Providers





                    Care Team Member Name Role                Phone

 

                    Soraida Diego    Unavailable         [phone]







PROBLEMS





          Type      Condition ICD9-CM Code QVU19-BX Code Onset Dates Condition S

tatus SNOMED 

Code

 

          Problem   Conjunctivitis, rosacea 372.31                        Active

    56678614

 

          Problem   Bunion of great toe of left foot 727.1                      

   Active    542020222

 

          Problem   Vitamin D deficiency 268.9                         Active   

 30600715

 

          Problem   Obesity (BMI 30.0-34.9)           E66.9               Active

    592348002789743

 

          Problem   General medical exam           Z00.00              Active   

 198560757

 

          Problem   Gynecologic exam normal           Z01.419             Active

    62525170

 

          Problem   Hypertriglyceridemia           E78.1               Active   

 449389462

 

          Problem   Screening For Breast Cancer           Z12.39              Ac

tive    410550953

 

          Problem   Vitamin D deficiency, unspecified           E55.9           

    Active    96807543

 

          Problem   Morbid obesity with BMI of 45.0-49.9, adult 278.01          

              Active    401419118

 

          Problem   Allergic Rhinitis Unspecified 477.9                         

Active    68191885

 

          Problem   Back Pain, Low 724.2                         Active    38896

9007

 

          Problem   Rosacea   695.3                         Active    961015284

 

          Problem   Hyperhidrosis, Primary Focal 705.21                        A

ctive    545861174

 

          Problem   Hypertriglyceridemia 272.1                         Active   

 283097795







ALLERGIES

No Information



SOCIAL HISTORY

Never Assessed



PLAN OF CARE





VITAL SIGNS





MEDICATIONS





        Medication Instructions Dosage  Frequency Start Date End Date Duration S

tatus

 

        Contrave 90 mg-8 mg orally 2 times a day 2 tab(s) 12h     19 2017  

               Active







RESULTS

No Results



PROCEDURES

No Known procedures



IMMUNIZATIONS

No Known Immunizations



MEDICAL (GENERAL) HISTORY





                    Type                Description         Date

 

                    Medical History     heel pain            

 

                    Medical History     Do you take vitamins regularly? No  

 

                    Medical History     Do you take OTC regularly? Yes  

 

                    Medical History     Do you take Diet Supplements regularly? 

No  

 

                    Medical History     Do you take any medications? Yes  

 

                    Medical History     migraines            

 

                    Medical History     neck pain            

 

                    Medical History     low back pain        

 

                    Medical History     rosacea              

 

                    Medical History     "respiratory issues"; frequent bronchiti

s  

 

                    Medical History     DUB, resolved w/ Mirena  

 

                    Medical History     knee pain            

 

                    Medical History     occular rosacea      

 

                    Surgical History    right knee arthoscopy 

 

                    Surgical History    D&C x2 w/ miscarriages  

 

                    Surgical History    foot surgery   x2 Right foot 2014

 

                    Surgical History    stomach sleeve      nov 2015

 

                    Surgical History    Hystorectomy        2016

 

                    Hospitalization History pre-eclampsia

## 2020-01-03 NOTE — XMS REPORT
Continuity of Care Document

                             Created on: 2020



Elba Bradley

External Reference #: [id number]

: 1975

Sex: Female



Demographics





                          Address                   [address]  [address]

 

                          Home Phone                [phone] x

 

                          Preferred Language        Unknown

 

                          Marital Status            Unknown

 

                          Synagogue Affiliation     Unknown

 

                          Race                      Unknown

 

                          Ethnic Group              Unknown





Author





                          Organization              Unknown

 

                          Address                   Unknown

 

                          Phone                     Unavailable



              



Allergies

      



             Active           Description           Code           Type         

  Severity   

                Reaction           Onset           Reported/Identified          

 

Relationship to Patient                 Clinical Status        

 

             Yes           strawberry           [id number]           Drug Allerg

y           

Unknown           N/A                        2019                         

  

     



                  



Medications

      



There is no data.                  



Problems

      



             Date Dx Coded           Attending           Type           Code    

       

Diagnosis                               Diagnosed By        

 

                2019           CLIVE DPM, AGUSTO Q           Ot            

  Z01.818          

                          ENCOUNTER FOR OTHER PREPROCEDURAL EXAMIN              

      

 

                2019           CLIVE DPM, AGUSTO Q           Ot            

  Z01.818          

                          ENCOUNTER FOR OTHER PREPROCEDURAL EXAMIN              

      

 

             2019           CLIVE DPM, AGUSTO Q           Ot           E66.

01           

MORBID (SEVERE) OBESITY DUE TO EXCESS CA                    

 

             2019           CLIVE DPM, AGUSTO Q           Ot           M20.

12           

HALLUX VALGUS (ACQUIRED), LEFT FOOT                    

 

                2019           CLIVE DPM, AGUSTO Q           Ot            

  T84.84XA         

                          PAIN DUE TO INTERNAL ORTHOPEDIC PROSTH D              

      

 

             2019           CLIVE DPM, AGUSTO Q           Ot           Z68.

42           

BODY MASS INDEX (BMI) 45.0-49.9, ADULT                    

 

             2019           CLIVE DPM, AGUSTO Q           Ot           Z90.

49           

ACQUIRED ABSENCE OF OTHER SPECIFIED PART                    

 

                2019           CLIVE DPM, AGUSTO Q           Ot            

  Z90.710          

                          ACQUIRED ABSENCE OF BOTH CERVIX AND UTER              

      

 

                2019           CLIVE DPM, AGUSTO Q           Ot            

  Z91.018          

                          ALLERGY TO OTHER FOODS                    

 

             12/15/2019           CLIVE DPM, AGUSTO Q           Ot           E66.

01           

MORBID (SEVERE) OBESITY DUE TO EXCESS CA                    

 

             12/15/2019           CLIVE DPM, AGUSTO Q           Ot           M20.

12           

HALLUX VALGUS (ACQUIRED), LEFT FOOT                    

 

                12/15/2019           CLIVE DPM, AGUSTO Q           Ot            

  T84.84XA         

                          PAIN DUE TO INTERNAL ORTHOPEDIC PROSTH D              

      

 

             12/15/2019           CLIVE DPM, AGUSTO Q           Ot           Z68.

42           

BODY MASS INDEX (BMI) 45.0-49.9, ADULT                    

 

             12/15/2019           CLIVE DPM, AGUSTO Q           Ot           Z90.

49           

ACQUIRED ABSENCE OF OTHER SPECIFIED PART                    

 

                12/15/2019           CLIVE DPTAMARA ALLANIN Q           Ot            

  Z90.710          

                          ACQUIRED ABSENCE OF BOTH CERVIX AND UTER              

      

 

                12/15/2019           CLIVE DPTAMARA ALLANIN Q           Ot            

  Z91.018          

                          ALLERGY TO OTHER FOODS                    



                                                



Procedures

      



There is no data.                  



Results

      



                    Test                Result              Range        

 

                                        Methicillin resistant Staphylococcus aur

eus (MRSA) screening culture - 19 

06:25         

 

                          Methicillin resistant Staphylococcus aureus (MRSA) scr

eening culture           

NEG                                     NRG        



                



Encounters

      



                ACCT No.           Visit Date/Time           Discharge          

 Status         

             Pt. Type           Provider           Facility           Loc./Unit 

          

Complaint        

 

                [id number]           2019 08:32:10           2019 23:59:

59           CLS

                Outpatient           Amor Harper                        

          

                                                 

 

                [id number]           10/09/2019 11:25:36           10/09/2019 23:59:

59           CLS

                Outpatient           Amor Harper                        

          

                                                 

 

                [id number]           2019 12:33:03           2019 23:59:

59           CLS

                Outpatient           Akbar Redd                         

          

                                                 

 

                [id number]           2019 16:17:00           2019 23:59:

59           CLS

                Outpatient           Alon Zepeda TEJAL                            

          

                                                 

 

                [id number]           2019 17:35:35           2019 23:59:

59           CLS

                Outpatient           Amor Harper                        

          

                                                 

 

                [id number]           2019 09:19:06           2019 23:59:

59           CLS

                Outpatient           Amor Harper                        

          

                                                 

 

                [id number]           2019 09:57:44           2019 23:59:

59           CLS

                Outpatient           Brenda Lora                      

          

                                                 

 

                [id number]           2019 09:04:24           2019 23:59:

59           CLS

                Outpatient           Amor Harper                        

          

                                                 

 

                [id number]           2019 19:24:07           2019 23:59:

59           CLS

             Outpatient           Moisés Walls                                   

     

     

 

                [id number]           2019 17:40:36           2019 23:59:

59           CLS

                Outpatient           Amor Harper                        

          

                                                 

 

                    [id number]           2019 06:03:00           

019 12:25:00        

                DIS             Outpatient           CLIVE DPMTAMARAIN Q         

  [facility]                       WINSOME BURNHAM        

 

                    [id number]           2019 05:36:00            14:59:00        

                DIS             Outpatient           AGUSTO DUFFY DPM         

  [facility]           PREOP                     HALLUX VALGUS        

 

             [id number]           2017 13:43:00                        ACT    

       Unknown

## 2020-01-03 NOTE — XMS REPORT
MU2 Ambulatory Summary

                             Created on: 2019



Elba Bradley

External Reference #: [id number]

: 1975

Sex: Female



Demographics





                          Address                   [address]

[place]  [address]

 

                          Home Phone                [phone]

 

                          Preferred Language        English

 

                          Marital Status            

 

                          Druze Affiliation     Unknown

 

                          Race                      Other Race

 

                          Ethnic Group              Not  or 





Author





                          Author                    Elba Walls

 

                          [facility] 

oup

 

                          Address                   [address]

[place], [place]  [address]



 

                          Phone                     [phone]







Care Team Providers





                    Care Team Member Name Role                Phone

 

                    Moisés Walls         PCP                 [phone]

 

                    Amor Harper PreferredProvider   [phone]







Allergies and Adverse Reactions





                    Name                Reaction            Notes

 

                      NO KNOWN DRUG ALLERGIES                      







Plan of Treatment

Not available.



Medications





                                        Active 

 

             Name         Start Date   Estimated Completion Date SIG          Co

mments

 

             Mucinex D  mg oral tablet extended release 12 hr             

                            

 

             Advil Cold and Sinus oral                                         

 

             albuterol sulfate inhalation                                       

  

 

                levofloxacin 500 mg oral tablet 2019       

take 1 tablet (500 mg) by 

oral route once daily for 10 days        

 

                ipratropium bromide 0.02 % inhalation solution 2019         

               inhale 1.25 

milliliters (250 mcg) via nebulizer by inhalation route 3 times per day  







                                         

 

             Name         Start Date   Expiration Date SIG          Comments

 

                Mirena Intrauterine IUD 20 mcg/24 hour (5 years)                

                 place 1 device by 

intrauterine route                       

 

             doxycycline hyclate Oral tablet 20 mg                           kayleigh

e 1 tablet by oral route daily  







                                        Discontinued 

 

             Name         Start Date   Discontinued Date SIG          Comments

 

                doxycycline hyclate Oral capsule 100 mg                 

3       take 1 capsule (100 mg) by 

oral route once daily                    







Problem List

Not available.



Vital Signs





     Date Time BP-Sys(mm[Hg] BP-Tami(mm[Hg]) HR(bpm) RR(rpm) Temp WT   HT   HC   

BMI  BSA  BMI

 Percentile                             O2 Sat(%)

 

       2019 6:28:00  mmHg 102 mmHg 101 bpm 24 rpm 98.2 F 181.5 lbs 67.

25 in  

                28.2157 kg/m  1.9764 m                      97 %

 

       2019 4:41:00  mmHg 72 mmHg 68 bpm 16 rpm 97.7 F 188.125 lbs 67.

25 in  

                29.25 kg/m2     2.01 m2                         98 %

 

      2013 8:46:00  mmHg 92 mmHg 72 bpm       97.6 F 263.25 lbs 67 in

       41.2304

 kg/m             2.3759 m                               

 

      2013 9:49:00  mmHg 98 mmHg 90 bpm       97 F  266.25 lbs 67 in 

      41.70 

kg/m2               2.39 m2                                  

 

      2012 9:16:00  mmHg 90 mmHg 66 bpm       97.2 F 264 lbs 67 in  

     41.3478 

kg/m              2.3792 m                               







Social History





                    Name                Description         Comments

 

                    Tobacco             Never smoker         

 

                    Alcohol             Never                







History of Procedures





                    Date Ordered        Description         Order Status

 

                    2012 12:00 AM CYTOPATH C/V MANUAL Reviewed

 

                    2012 12:00 AM SPECIMEN HANDLING OFFICE-LAB Reviewed

 

                    2012 12:00 AM CHLAMYDIA CULTURE   Reviewed

 

                    2012 12:00 AM N.GONORRHOEAE DNA AMP PROB Reviewed

 

                    2013 12:00 AM  URINE PREGNANCY TEST Reviewed

 

                    2013 12:00 AM  INSERT INTRAUTERINE DEVICE Reviewed

 

                    2013 12:00 AM  Mirena              Reviewed

 

                    2019 12:00 AM   FLU VAC NO PRSV 4 TAWANDA 3 YRS+ Reviewed

 

                    2019 12:00 AM   THER/PROPH/DIAG INJ SC/IM Reviewed

 

                    2019 12:00 AM   Decadron 8mg Injection Reviewed

 

                    2019 12:00 AM   Depo-Medrol 80mg Injection Reviewed

 

                    2019 12:00 AM   THER/PROPH/DIAG INJ SC/IM Reviewed







Results Summary

Not available.



History Of Immunizations





      Name  Date Admin Mfg Name Mfg Code Trade Name Lot#  Route Inj   Vis Given 

Vis Pub 

CVX

 

        Influenza 10/4/2017 Not Entered NE      Not Entered         Not Entered 

Not Entered 

1                  158

 

          Influenza 2019  ID [employer] or [place] BCQ       Flulaval qu

adrivalent 3PM59     

Intramuscular   Right Deltoid   2019        158







History of Past Illness





                    Name                Date of Onset       Comments

 

                    Rosacea                                  

 

                    Chicken pox                              

 

                    Allergic rhinitis                        

 

                    Bunion of left foot                      

 

                    Bunion of right foot                      

 

                    Plantar fasciitis                        

 

                    Asthma                                   

 

                    Reactive hypoglycemia                      

 

                    Routine gynecological examination Dec 19 2012  9:18AM  

 

                                        Special investigations and examinations;

 pregnancy examination or test; 

pregnancy examination or test, negative result 2013 10:00AM        

 

                    IUD insertion       2013  9:52AM  

 

                    IUD Check/Removal/Management/Reinsertion Aug 28 2013  8:54AM

  

 

                    Flu vaccine need    2019  4:56PM  

 

                    Neck pain           2019  4:56PM  

 

                    Back pain           2019  4:56PM  

 

                    Knee pain           2019  4:56PM  

 

                    Bunion of great toe of left foot 2019  4:56PM  

 

                    Vitamin D deficiency 2019  4:56PM  

 

                    History of bariatric surgery 2019  4:56PM  

 

                    Bronchitis, Acute   2019  6:33PM  

 

                    Sinusitis, Acute    2019  6:33PM  







Payers





           Insurance Name Company Name Plan Name  Plan Number Policy Number Slim

cy Group 

Number                                  Start Date

 

                    BCBS      [employer] [facility]           [id number]           N/

A

 

                    [employer]BS      [employer] [facility]           [id number]           N/

A







History of Encounters





                    Visit Date          Visit Type          Provider

 

                    2019            Office visit        Moisés Walls NP

 

                    2019            Office visit        Amor Harper 

APRN

 

                    2013           Office visit        Pritesh Shoemaker MD

 

                    2013           Office visit        Pritesh Shoemaker MD

 

                    2012          Office visit        Pritesh Shoemaker MD

## 2020-01-03 NOTE — XMS REPORT
MU2 Ambulatory Summary

                             Created on: 2019



Elba Bradley

External Reference #: [id number]

: 1975

Sex: Female



Demographics





                          Address                   [address]

[place]  [id number]

 

                          Home Phone                [phone]

 

                          Preferred Language        English

 

                          Marital Status            

 

                          Taoism Affiliation     Unknown

 

                          Race                      Other Race

 

                          Ethnic Group              Not  or 





Author





                          Author                    Elba Harper

 

                          Organization              [facility]

oup

 

                          Address                   [address]

[place], [place]  [address]



 

                          Phone                     [phone]







Care Team Providers





                    Care Team Member Name Role                Phone

 

                    Amor Harper PCP                 [phone]

 

                    Amor Harper PreferredProvider   [phone]







Allergies and Adverse Reactions





                    Name                Reaction            Notes

 

                      NO KNOWN DRUG ALLERGIES                      







Plan of Treatment

Not available.



Medications





                                         

 

             Name         Start Date   Expiration Date SIG          Comments

 

                Mirena Intrauterine IUD 20 mcg/24 hour (5 years)                

                 place 1 device by 

intrauterine route                       

 

             doxycycline hyclate Oral tablet 20 mg                           kayleigh

e 1 tablet by oral route daily  







                                        Discontinued 

 

             Name         Start Date   Discontinued Date SIG          Comments

 

                doxycycline hyclate Oral capsule 100 mg                 

3       take 1 capsule (100 mg) by 

oral route once daily                    







Problem List

Not available.



Vital Signs





     Date Time BP-Sys(mm[Hg] BP-Tami(mm[Hg]) HR(bpm) RR(rpm) Temp WT   HT   HC   

BMI  BSA  BMI

 Percentile                             O2 Sat(%)

 

       2019 4:41:00  mmHg 72 mmHg 68 bpm 16 rpm 97.7 F 188.125 lbs 67.

25 in  

                29.2456 kg/m  2.0122 m                      98 %

 

      2013 8:46:00  mmHg 92 mmHg 72 bpm       97.6 F 263.25 lbs 67 in

       41.23 

kg/m2               2.38 m2                                  

 

      2013 9:49:00  mmHg 98 mmHg 90 bpm       97 F  266.25 lbs 67 in 

      41.7002 

kg/m              2.3894 m                               

 

      2012 9:16:00  mmHg 90 mmHg 66 bpm       97.2 F 264 lbs 67 in  

     41.3478 

kg/m              2.38 m2                                  







Social History





                    Name                Description         Comments

 

                    Tobacco             Never smoker         

 

                    Alcohol             Never                







History of Procedures





                    Date Ordered        Description         Order Status

 

                    2012 12:00 AM CYTOPATH C/V MANUAL Reviewed

 

                    2012 12:00 AM SPECIMEN HANDLING OFFICE-LAB Reviewed

 

                    2012 12:00 AM CHLAMYDIA CULTURE   Reviewed

 

                    2012 12:00 AM N.GONORRHOEAE DNA AMP PROB Reviewed

 

                    2013 12:00 AM  URINE PREGNANCY TEST Reviewed

 

                    2013 12:00 AM  INSERT INTRAUTERINE DEVICE Reviewed

 

                    2013 12:00 AM  Mirena              Reviewed

 

                    2019 12:00 AM   FLU VAC NO PRSV 4 TAWANDA 3 YRS+ Reviewed

 

                    2019 12:00 AM   THER/PROPH/DIAG INJ SC/IM Reviewed







Results Summary

Not available.



History Of Immunizations





      Name  Date Admin Mfg Name Mfg Code Trade Name Lot#  Route Inj   Vis Given 

Vis Pub 

CVX

 

        Influenza 10/4/2017 Not Entered NE      Not Entered         Not Entered 

Not Entered 

1                  158

 

          Influenza 2019  ID [employer] or [place] BCQ       Flulaval qu

adrivalent 3PM59     

Intramuscular   Right Deltoid   2019        158







History of Past Illness





                    Name                Date of Onset       Comments

 

                    Rosacea                                  

 

                    Chicken pox                              

 

                    Allergic rhinitis                        

 

                    Bunion of left foot                      

 

                    Bunion of right foot                      

 

                    Plantar fasciitis                        

 

                    Asthma                                   

 

                    Reactive hypoglycemia                      

 

                    Routine gynecological examination Dec 19 2012  9:18AM  

 

                                        Special investigations and examinations;

 pregnancy examination or test; 

pregnancy examination or test, negative result 2013 10:00AM        

 

                    IUD insertion       2013  9:52AM  

 

                    IUD Check/Removal/Management/Reinsertion Aug 28 2013  8:54AM

  

 

                    Flu vaccine need    2019  4:56PM  

 

                    Neck pain           2019  4:56PM  

 

                    Back pain           2019  4:56PM  

 

                    Knee pain           2019  4:56PM  

 

                    Bunion of great toe of left foot 2019  4:56PM  

 

                    Vitamin D deficiency 2019  4:56PM  

 

                    History of bariatric surgery 2019  4:56PM  







Payers





           Insurance Name Company Name Plan Name  Plan Number Policy Number Slim

cy Group 

Number                                  Start Date

 

                    [employer]BS      [employer] [place]           [id number]           N/

A

 

                    [employer]BS      [employer] [facility]           [id number]           N/

A







History of Encounters





                    Visit Date          Visit Type          Provider

 

                    2019            Office visit        Amor Harper 

APRN

 

                    2013           Office visit        Pritesh Shoemaker MD

 

                    2013           Office visit        Pritesh Shoemaker MD

 

                    2012          Office visit        Pritesh Shoemaker MD

## 2020-01-03 NOTE — XMS REPORT
MU2 Ambulatory Summary

                             Created on: 2019



Elba Bradley

External Reference #: [id number]

: 1975

Sex: Female



Demographics





                          Address                   [address]

[place]  [id number]

 

                          Home Phone                [phone]

 

                          Preferred Language        English

 

                          Marital Status            

 

                          Yazidi Affiliation     Unknown

 

                          Race                      Other Race

 

                          Ethnic Group              Not  or 





Author





                          Author                    Elba Redd

 

                          [facility] 

[facility]

 

                          Address                   [address]

[place], [place]  [id number]



 

                          Phone                     [phone]







Care Team Providers





                    Care Team Member Name Role                Phone

 

                    Akbar Redd  PCP                 Unavailable

 

                    Amor Harper PreferredProvider   [phone]







Allergies and Adverse Reactions





                    Name                Reaction            Notes

 

                    Strawberry                               







Plan of Treatment





             Planned Activity Comments     Planned Date Planned Time Plan/Goal

 

             Urine culture and sensitivity              2019    12:00 AM   

   







Medications





                                        Active 

 

             Name         Start Date   Estimated Completion Date SIG          Co

mments

 

             Vitamin D3 400 unit oral capsule                           take 1 c

apsule by oral route daily  

 

             Multiple Vitamins oral tablet                           take 1 tabl

et by oral route daily  

 

                Calcium 500 500 mg calcium (1,250 mg) oral tablet               

                  take 1 tablet by oral 

route daily                              

 

             ibuprofen 800 mg oral tablet                           take 1 table

t by oral route 2 times a day  

 

                Cipro 500 mg oral tablet 2019       take 1 

tablet (500 mg) by oral 

route every 12 hours for 3 days          







                                         

 

             Name         Start Date   Expiration Date SIG          Comments

 

                Mirena Intrauterine IUD 20 mcg/24 hour (5 years)                

                 place 1 device by 

intrauterine route                       

 

             doxycycline hyclate Oral tablet 20 mg                           [place]

e 1 tablet by oral route daily  

 

                levofloxacin 500 mg oral tablet 2019       

take 1 tablet (500 mg) by 

oral route once daily for 10 days        

 

                diclofenac sodium 75 mg oral tablet,delayed release (DR/EC) 4/10

/2019       5/10/2019       

take 1 tablet (75 mg) by oral route 2 times per day for 30 days  







                                        Discontinued 

 

             Name         Start Date   Discontinued Date SIG          Comments

 

                doxycycline hyclate Oral capsule 100 mg                 

3       take 1 capsule (100 mg) by 

oral route once daily                    

 

             Mucinex D  mg oral tablet extended release 12 hr             

 2019                  

 

             Advil Cold and Sinus oral              2019                  

 

             albuterol sulfate inhalation              2019                

  

 

                ipratropium bromide 0.02 % inhalation solution 2019       inhale 1.25 

milliliters (250 mcg) via nebulizer by inhalation route 3 times per day  

 

                cholecalciferol (vitamin D3) 50,000 unit oral capsule 5/10/2019 

      2019       take 1

capsule by oral route once  weekly       

 

                dicyclomine 20 mg oral tablet 2019       ta

ke 1 tablet (20 mg) by oral 

route 4 times per day as needed          







Problem List

Not available.



Vital Signs





     Date Time BP-Sys(mm[Hg] BP-Tami(mm[Hg]) HR(bpm) RR(rpm) Temp WT   HT   HC   

BMI  BSA  BMI

 Percentile                             O2 Sat(%)

 

        2019 11:38:00  mm[Hg] 86 mm[Hg] 58 {beats}/min 16 rpm  98.1 F

  182.25 

lbs        67 in                 28.5441 kg/m2 1.9768 m2             100 %

 

        2019 8:02:00  mm[Hg] 80 mm[Hg] 84 {beats}/min 16 rpm  97.4 F 

 187 lbs 

67 in                     29.29 kg/m2  2.00 m2                   97 %

 

        2019 4:42:00  mm[Hg] 84 mm[Hg] 77 {beats}/min 16 rpm  97.5 F 

 192 lbs 

67 in                     30.0711 kg/m2 2.029 m2                  98 %

 

        2019 9:27:00  mm[Hg] 80 mm[Hg] 65 {beats}/min         98.4 F 

 187 lbs 67.25 

in                        29.07 kg/m2  2.01 m2                    

 

        2019 6:28:00  mm[Hg] 102 mm[Hg] 101 {beats}/min 24 rpm  98.2 F

  181.5 

lbs        67.25 in              28.2157 kg/m2 1.9764 m2             97 %

 

        2019 4:41:00  mm[Hg] 72 mm[Hg] 68 {beats}/min 16 rpm  97.7 F  

188.125 

lbs        67.25 in              29.25 kg/m2 2.01 m2               98 %

 

        2013 8:46:00  mm[Hg] 92 mm[Hg] 72 {beats}/min         97.6 F 

 263.25 lbs 67 

in                        41.2304 kg/m2 2.3759 m2                  

 

        2013 9:49:00  mm[Hg] 98 mm[Hg] 90 {beats}/min         97 F   

 266.25 lbs 67 in

                          41.70 kg/m2  2.39 m2                    

 

        2012 9:16:00  mm[Hg] 90 mm[Hg] 66 {beats}/min         97.2 F

  264 lbs 67 in

                          41.3478 kg/m2 2.3792 m2                  







Social History





                    Name                Description         Comments

 

                    Tobacco             Never smoker         

 

                    Alcohol             Never                







History of Procedures





                    Date Ordered        Description         Order Status

 

                    2012 12:00 AM CYTOPATH C/V MANUAL Reviewed

 

                    2012 12:00 AM SPECIMEN HANDLING OFFICE-LAB Reviewed

 

                    2012 12:00 AM CHLAMYDIA CULTURE   Reviewed

 

                    2012 12:00 AM N.GONORRHOEAE DNA AMP PROB Reviewed

 

                    2013 12:00 AM  URINE PREGNANCY TEST Reviewed

 

                    2013 12:00 AM  INSERT INTRAUTERINE DEVICE Reviewed

 

                    2013 12:00 AM  Mirena              Reviewed

 

                    2019 12:00 AM   FLU VAC NO PRSV 4 TAWANDA 3 YRS+ Reviewed

 

                    2019 12:00 AM   THER/PROPH/DIAG INJ SC/IM Reviewed

 

                    2019 12:00 AM   Decadron 8mg Injection Reviewed

 

                    2019 12:00 AM   Depo-Medrol 80mg Injection Reviewed

 

                    2019 12:00 AM   THER/PROPH/DIAG INJ SC/IM Reviewed

 

                    2019 12:00 AM  MRI NECK SPINE W/O DYE Reviewed

 

                    2019 12:00 AM  X-RAY EXAM L-S SPINE 2/3 VWS Reviewed

 

                    2019 12:00 AM   ROUTINE VENIPUNCTURE Reviewed

 

                    2019 12:00 AM   COMPREHEN METABOLIC PANEL Reviewed

 

                    2019 12:00 AM   GLYCOSYLATED HEMOGLOBIN TEST Reviewed

 

                    2019 12:00 AM   VITAMIN D 25 HYDROXY Reviewed

 

                    2019 12:00 AM   ASSAY THYROID STIM HORMONE Reviewed

 

                    2019 12:00 AM   LIPID PANEL         Reviewed

 

                    2019 12:00 AM  Screening mammography, bilateral Reviewe

d

 

                    2019 12:00 AM  MAMMOGRAM BOTH BREASTS Reviewed

 

                    2019 12:00 AM   BREAST TOMOSYNTHESIS UNI Reviewed

 

                    2019 12:00 AM  ROUTINE VENIPUNCTURE Reviewed

 

                    2019 12:00 AM  VITAMIN D 25 HYDROXY Reviewed

 

                    2019 12:08 PM  URINALYSIS AUTO W/O SCOPE Reviewed







Results Summary





                          Date and Description      Results

 

                          2019 8:25 AM          GLUCOSE 86 SODIUM 140 POTASS

IUM 4.3 CHLORIDE 108.0 mmol/LCO2 26

 BUN 13.0 mg/dLCREATININE 0.730 mg/dLSGOT/AST 15 SGPT/ALT 8 ALK PHOS 52 TOTAL 
PROTEIN 6.1 ALBUMIN 4.0 TOTAL BILI 0.8 CALCIUM 9.40 mg/dLAGE 43 GFR NonAA 86 GFR
  eGFR 86 eGFR AA* >60 mL/min/1.73 [id number] 102 CHOLESTEROL 204.0 
mg/dLHDL 54 TOT CHOL/HDL 3.8 LDL (CALC) 130 VITAMIN D 17.7 TSH 0.98 HGB A1C 5.10
 %Est Avg Glucose 99.7 

 

                          2019 8:24 AM         VITAMIN D 29.8 

 

                          2019 12:08 PM        Glucose Ur-sCnc neg Bilirub 

Ur Ql neg Ketones Ur Ql Strip 

trace Sp Gr Ur Qn >1.030 Hgb Ur Ql Strip neg pH Ur-LsCnc 5.5 Prot Ur Ql Strip 
neg Urobilinogen Ur-mCnc 0.2 Nitrite Ur Ql Strip neg WBC # Ur trace 







History Of Immunizations





      Name  Date Admin Mfg Name Mfg Code Trade Name Lot#  Route Inj   Vis Given 

Vis Pub 

CVX

 

        Influenza 10/4/2017 Not Entered NE      Not Entered         Not Entered 

Not Entered 

1                  158

 

          Influenza 2019  ID [employer] or [place] BCQ       Flulaval qu

adrivalent [id number]     

Intramuscular   Right Deltoid   2019        158







History of Past Illness





                    Name                Date of Onset       Comments

 

                    Rosacea                                  

 

                    Chicken pox                              

 

                    Allergic rhinitis                        

 

                    Bunion of left foot                      

 

                    Bunion of right foot                      

 

                    Plantar fasciitis                        

 

                    Asthma                                   

 

                    Reactive hypoglycemia                      

 

                    Cervical somatic dysfunction                      

 

                    Thoracic region somatic dysfunction                      

 

                    Lumbar Somatic Dysfunction                      

 

                    Routine gynecological examination Dec 19 2012  9:18AM  

 

                                        Special investigations and examinations;

 pregnancy examination or test; 

pregnancy examination or test, negative result 2013 10:00AM        

 

                    IUD insertion       2013  9:52AM  

 

                    IUD Check/Removal/Management/Reinsertion Aug 28 2013  8:54AM

  

 

                    Flu vaccine need    2019  4:56PM  

 

                    Neck pain           2019  4:56PM  

 

                    Back pain           2019  4:56PM  

 

                    Knee pain           2019  4:56PM  

 

                    Bunion of great toe of left foot 2019  4:56PM  

 

                    Vitamin D deficiency 2019  4:56PM  

 

                    History of bariatric surgery 2019  4:56PM  

 

                    Bronchitis, Acute   2019  6:33PM  

 

                    Sinusitis, Acute    2019  6:33PM  

 

                    Cervicalgia         2019 12:25PM  

 

                    Other chronic pain  2019 12:25PM  

 

                    Dorsalgia, unspecified 2019 12:25PM  

 

                    Other chronic pain  2019 12:25PM  

 

                    Screening for ischemic heart disease May  8 2019 10:52AM  

 

                    Myalgia             May  8 2019 10:52AM  

 

                    Reactive hypoglycemia May  8 2019 10:52AM  

 

                    History of bariatric surgery May  8 2019 10:52AM  

 

                    Encounter for screening mammogram for breast cancer May 13 2

019  1:28PM  

 

                    Encntr for gyn exam (general) (routine) w/o abn findings May

 13 2019  9:33AM  

 

                          Special screening for malignant neoplasms; breast; oth

er screening mammogram May

 13 2019  9:33AM                         

 

                    Abnormal Mammogram  2019  4:15PM  

 

                    Vitamin D deficiency 2019 10:39AM  

 

                    Right epiphora      2019  4:42PM  

 

                    Dysuria             Sep 21 2019 11:46AM  







Payers





           Insurance Name Company Name Plan Name  Plan Number Policy Number Slim

cy Group 

Number                                  Start Date

 

                    [employer]      [employer] [place]           [id number]           N/

A

 

                    [employer]      [employer]           [id number]           N/

A







History of Encounters





                    Visit Date          Visit Type          Provider

 

                    2019           Office visit        Akbar DUFFY

PRN

 

                    2019           Office visit        Alon Zepeda DO

 

                    2019           Office visit        Amor Harper 

APRN

 

                    2019           Laboratory          Amor Harper 

APRN

 

                    2019           Office visit         

 

                    2019           Office visit        Brenda melendrez APRN

 

                    2019            Laboratory          Amor Harper 

APRN

 

                    2019            Office visit        Moisés Walls NP

 

                    2019            Office visit        Amor Harper 

APRN

 

                    2013           Office visit        Pritesh Shoemaker MD

 

                    2013           Office visit        Pritesh Shoemaker MD

 

                    2012          Office visit        Pritesh Shoemaker MD

## 2020-01-03 NOTE — XMS REPORT
MU2 Ambulatory Summary

                             Created on: 2019



Elba Bradley

External Reference #: [id number]

: 1975

Sex: Female



Demographics





                          Address                   [address]

[place]  [address]

 

                          Home Phone                [phone]

 

                          Preferred Language        English

 

                          Marital Status            

 

                          Taoism Affiliation     Unknown

 

                          Race                      Other Race

 

                          Ethnic Group              Not  or 





Author





                          Author                    Elba Harper

 

                          [facility]

oup

 

                          Address                   [address]

[place], [place]  [id number]



 

                          Phone                     [phone]







Care Team Providers





                    Care Team Member Name Role                Phone

 

                    Amor Harper PCP                 [phone]

 

                    Amor Harper PreferredProvider   [phone]







Allergies and Adverse Reactions





                    Name                Reaction            Notes

 

                      NO KNOWN DRUG ALLERGIES                      







Plan of Treatment





             Planned Activity Comments     Planned Date Planned Time Plan/Goal

 

             Breast ultrasound              2019     12:00 AM      

 

             VITAMIN D (25 HYDROXY)              2019    12:00 AM      







Medications





                                        Active 

 

             Name         Start Date   Estimated Completion Date SIG          Co

mments

 

             Mucinex D  mg oral tablet extended release 12 hr             

                            

 

             Advil Cold and Sinus oral                                         

 

             albuterol sulfate inhalation                                       

  

 

                ipratropium bromide 0.02 % inhalation solution 2019         

               inhale 1.25 

milliliters (250 mcg) via nebulizer by inhalation route 3 times per day  

 

                cholecalciferol (vitamin D3) 50,000 unit oral capsule 5/10/2019 

                      take 1 capsule

by oral route once  weekly               







                                         

 

             Name         Start Date   Expiration Date SIG          Comments

 

                Mirena Intrauterine IUD 20 mcg/24 hour (5 years)                

                 place 1 device by 

intrauterine route                       

 

             doxycycline hyclate Oral tablet 20 mg                           kayleigh

e 1 tablet by oral route daily  

 

                levofloxacin 500 mg oral tablet 2019       

take 1 tablet (500 mg) by 

oral route once daily for 10 days        

 

                diclofenac sodium 75 mg oral tablet,delayed release (DR/EC) 4/10

/2019       5/10/2019       

take 1 tablet (75 mg) by oral route 2 times per day for 30 days  







                                        Discontinued 

 

             Name         Start Date   Discontinued Date SIG          Comments

 

                doxycycline hyclate Oral capsule 100 mg                 

3       take 1 capsule (100 mg) by 

oral route once daily                    







Problem List

Not available.



Vital Signs





     Date Time BP-Sys(mm[Hg] BP-Tami(mm[Hg]) HR(bpm) RR(rpm) Temp WT   HT   HC   

BMI  BSA  BMI

 Percentile                             O2 Sat(%)

 

      2019 9:27:00  mmHg 80 mmHg 65 bpm       98.4 F 187 lbs 67.25 in

       29.0707

 kg/m             2.0062 m                               

 

       2019 6:28:00  mmHg 102 mmHg 101 bpm 24 rpm 98.2 F 181.5 lbs 67.

25 in  

                28.22 kg/m2     1.98 m2                         97 %

 

       2019 4:41:00  mmHg 72 mmHg 68 bpm 16 rpm 97.7 F 188.125 lbs 67.

25 in  

                29.25 kg/m2     2.01 m2                         98 %

 

      2013 8:46:00  mmHg 92 mmHg 72 bpm       97.6 F 263.25 lbs 67 in

       41.2304

 kg/m             2.3759 m                               

 

      2013 9:49:00  mmHg 98 mmHg 90 bpm       97 F  266.25 lbs 67 in 

      41.70 

kg/m2               2.39 m2                                  

 

      2012 9:16:00  mmHg 90 mmHg 66 bpm       97.2 F 264 lbs 67 in  

     41.3478 

kg/m              2.3792 m                               







Social History





                    Name                Description         Comments

 

                    Tobacco             Never smoker         

 

                    Alcohol             Never                







History of Procedures





                    Date Ordered        Description         Order Status

 

                    2012 12:00 AM CYTOPATH C/V MANUAL Reviewed

 

                    2012 12:00 AM SPECIMEN HANDLING OFFICE-LAB Reviewed

 

                    2012 12:00 AM CHLAMYDIA CULTURE   Reviewed

 

                    2012 12:00 AM N.GONORRHOEAE DNA AMP PROB Reviewed

 

                    2013 12:00 AM  URINE PREGNANCY TEST Reviewed

 

                    2013 12:00 AM  INSERT INTRAUTERINE DEVICE Reviewed

 

                    2013 12:00 AM  Mirena              Reviewed

 

                    2019 12:00 AM   FLU VAC NO PRSV 4 TAWANDA 3 YRS+ Reviewed

 

                    2019 12:00 AM   THER/PROPH/DIAG INJ SC/IM Reviewed

 

                    2019 12:00 AM   Decadron 8mg Injection Reviewed

 

                    2019 12:00 AM   Depo-Medrol 80mg Injection Reviewed

 

                    2019 12:00 AM   THER/PROPH/DIAG INJ SC/IM Reviewed

 

                    2019 12:00 AM  MRI NECK SPINE W/O DYE Reviewed

 

                    2019 12:00 AM  X-RAY EXAM L-S SPINE 2/3 VWS Reviewed

 

                    2019 12:00 AM   ROUTINE VENIPUNCTURE Reviewed

 

                    2019 12:00 AM   COMPREHEN METABOLIC PANEL Reviewed

 

                    2019 12:00 AM   GLYCOSYLATED HEMOGLOBIN TEST Reviewed

 

                    2019 12:00 AM   VITAMIN D 25 HYDROXY Reviewed

 

                    2019 12:00 AM   ASSAY THYROID STIM HORMONE Reviewed

 

                    2019 12:00 AM   LIPID PANEL         Reviewed

 

                    2019 12:00 AM  Screening mammography, bilateral Reviewe

d

 

                    2019 12:00 AM  MAMMOGRAM BOTH BREASTS Reviewed

 

                    2019 12:00 AM   BREAST TOMOSYNTHESIS UNI Reviewed

 

                    2019 12:00 AM  ROUTINE VENIPUNCTURE Reviewed







Results Summary





                          Date and Description      Results

 

                          2019 8:25 AM          GLUCOSE 86 SODIUM 140 POTASS

IUM 4.3 CHLORIDE 108 CO2 26 BUN 13 

CREATININE 0.73 SGOT/AST 15 SGPT/ALT 8 ALK PHOS 52 TOTAL PROTEIN 6.1 ALBUMIN 4.0
 TOTAL BILI 0.8 CALCIUM 9.4 AGE 43 GFR NonAA 86 GFR  eGFR 86 eGFR AA* >60 
TRIGLYCERIDES 102 CHOLESTEROL 204 HDL 54 TOT CHOL/HDL 3.8 LDL (CALC) 130 VITAMIN
 D 17.7 TSH 0.98 HGB A1C 5.10 %Est Avg Glucose 99.7 







History Of Immunizations





      Name  Date Admin Mfg Name Mfg Code Trade Name Lot#  Route Inj   Vis Given 

Vis Pub 

CVX

 

        Influenza 10/4/2017 Not Entered NE      Not Entered         Not Entered 

Not Entered 

1                  158

 

          Influenza 2019  ID [facility] or [place] BCQ       Flulaval qu

adrivalent 3PM59     

Intramuscular   Right Deltoid   2019        158







History of Past Illness





                    Name                Date of Onset       Comments

 

                    Rosacea                                  

 

                    Chicken pox                              

 

                    Allergic rhinitis                        

 

                    Bunion of left foot                      

 

                    Bunion of right foot                      

 

                    Plantar fasciitis                        

 

                    Asthma                                   

 

                    Reactive hypoglycemia                      

 

                    Routine gynecological examination Dec 19 2012  9:18AM  

 

                                        Special investigations and examinations;

 pregnancy examination or test; 

pregnancy examination or test, negative result 2013 10:00AM        

 

                    IUD insertion       2013  9:52AM  

 

                    IUD Check/Removal/Management/Reinsertion Aug 28 2013  8:54AM

  

 

                    Flu vaccine need    2019  4:56PM  

 

                    Neck pain           2019  4:56PM  

 

                    Back pain           2019  4:56PM  

 

                    Knee pain           2019  4:56PM  

 

                    Bunion of great toe of left foot 2019  4:56PM  

 

                    Vitamin D deficiency 2019  4:56PM  

 

                    History of bariatric surgery 2019  4:56PM  

 

                    Bronchitis, Acute   2019  6:33PM  

 

                    Sinusitis, Acute    2019  6:33PM  

 

                    Cervicalgia         2019 12:25PM  

 

                    Other chronic pain  2019 12:25PM  

 

                    Dorsalgia, unspecified 2019 12:25PM  

 

                    Other chronic pain  2019 12:25PM  

 

                    Screening for ischemic heart disease May  8 2019 10:52AM  

 

                    Myalgia             May  8 2019 10:52AM  

 

                    Reactive hypoglycemia May  8 2019 10:52AM  

 

                    History of bariatric surgery May  8 2019 10:52AM  

 

                    Encounter for screening mammogram for breast cancer May 13 2

019  1:28PM  

 

                    Encntr for gyn exam (general) (routine) w/o abn findings May

 13 2019  9:33AM  

 

                          Special screening for malignant neoplasms; breast; ot

er screening mammogram May

 13 2019  9:33AM                         

 

                    Abnormal Mammogram  2019  4:15PM  

 

                    Vitamin D deficiency 2019 10:39AM  







Payers





           Insurance Name Company Name Plan Name  Plan Number Policy Number [facility] 

Number                                  Start Date

 

                    BCBS      [employer] [place]           [id number]           N/

A

 

                    BCBS      [employer] [place]           [id number]           N/

A







History of Encounters





                    Visit Date          Visit Type          Provider

 

                    2019           Laboratory          Amor Harper 

APRN

 

                    2019           Office visit         

 

                    2019           Office visit        Brenda melendrez APRN

 

                    2019            Laboratory          Amor Harper 

APRN

 

                    2019            Office visit        Moisés Walls NP

 

                    2019            Office visit        Amor Harper 

APRN

 

                    2013           Office visit        Pritesh Shoemaker MD

 

                    2013           Office visit        Pritesh Shoemaker MD

 

                    2012          Office visit        Pritesh Shoemaker MD

## 2020-01-03 NOTE — XMS REPORT
MU2 Ambulatory Summary

                             Created on: 2019



Elba Bradley

External Reference #: [id number]

: 1975

Sex: Female



Demographics





                          Address                   [address]

[place]  [address]

 

                          Home Phone                [phone]

 

                          Preferred Language        English

 

                          Marital Status            

 

                          Sikh Affiliation     Unknown

 

                          Race                      Other Race

 

                          Ethnic Group              Not  or 





Author





                          Author                    Elba Walls

 

                          [facility] 

oup

 

                          Address                   [address]

[place], [place]  [id number]



 

                          Phone                     [phone]







Care Team Providers





                    Care Team Member Name Role                Phone

 

                    Moisés Walls         PCP                 [phone]

 

                    Amor Harper PreferredProvider   [phone]







Allergies and Adverse Reactions





                    Name                Reaction            Notes

 

                      NO KNOWN DRUG ALLERGIES                      







Plan of Treatment





             Planned Activity Comments     Planned Date Planned Time Plan/Goal

 

             MRI CERVICAL SPINE W/O CONTRAST              2019    12:00 AM 

     







Medications





                                        Active 

 

             Name         Start Date   Estimated Completion Date SIG          Co

mments

 

             Mucinex D  mg oral tablet extended release 12 hr             

                            

 

             Advil Cold and Sinus oral                                         

 

             albuterol sulfate inhalation                                       

  

 

                ipratropium bromide 0.02 % inhalation solution 2019         

               inhale 1.25 

milliliters (250 mcg) via nebulizer by inhalation route 3 times per day  

 

                diclofenac sodium 75 mg oral tablet,delayed release (DR/EC) 4/10

/2019       5/10/2019       

take 1 tablet (75 mg) by oral route 2 times per day for 30 days  







                                         

 

             Name         Start Date   Expiration Date SIG          Comments

 

                Mirena Intrauterine IUD 20 mcg/24 hour (5 years)                

                 place 1 device by 

intrauterine route                       

 

             doxycycline hyclate Oral tablet 20 mg                           kayleigh

e 1 tablet by oral route daily  

 

                levofloxacin 500 mg oral tablet 2019       

take 1 tablet (500 mg) by 

oral route once daily for 10 days        







                                        Discontinued 

 

             Name         Start Date   Discontinued Date SIG          Comments

 

                doxycycline hyclate Oral capsule 100 mg                 

3       take 1 capsule (100 mg) by 

oral route once daily                    







Problem List

Not available.



Vital Signs





     Date Time BP-Sys(mm[Hg] BP-Tami(mm[Hg]) HR(bpm) RR(rpm) Temp WT   HT   HC   

BMI  BSA  BMI

 Percentile                             O2 Sat(%)

 

       2019 6:28:00  mmHg 102 mmHg 101 bpm 24 rpm 98.2 F 181.5 lbs 67.

25 in  

                28.2157 kg/m  1.9764 m                      97 %

 

       2019 4:41:00  mmHg 72 mmHg 68 bpm 16 rpm 97.7 F 188.125 lbs 67.

25 in  

                29.25 kg/m2     2.01 m2                         98 %

 

      2013 8:46:00  mmHg 92 mmHg 72 bpm       97.6 F 263.25 lbs 67 in

       41.2304

 kg/m             2.3759 m                               

 

      2013 9:49:00  mmHg 98 mmHg 90 bpm       97 F  266.25 lbs 67 in 

      41.70 

kg/m2               2.39 m2                                  

 

      2012 9:16:00  mmHg 90 mmHg 66 bpm       97.2 F 264 lbs 67 in  

     41.3478 

kg/m              2.3792 m                               







Social History





                    Name                Description         Comments

 

                    Tobacco             Never smoker         

 

                    Alcohol             Never                







History of Procedures





                    Date Ordered        Description         Order Status

 

                    2012 12:00 AM CYTOPATH C/V MANUAL Reviewed

 

                    2012 12:00 AM SPECIMEN HANDLING OFFICE-LAB Reviewed

 

                    2012 12:00 AM CHLAMYDIA CULTURE   Reviewed

 

                    2012 12:00 AM N.GONORRHOEAE DNA AMP PROB Reviewed

 

                    2013 12:00 AM  URINE PREGNANCY TEST Reviewed

 

                    2013 12:00 AM  INSERT INTRAUTERINE DEVICE Reviewed

 

                    2013 12:00 AM  Mirena              Reviewed

 

                    2019 12:00 AM   FLU VAC NO PRSV 4 TAWANDA 3 YRS+ Reviewed

 

                    2019 12:00 AM   THER/PROPH/DIAG INJ SC/IM Reviewed

 

                    2019 12:00 AM   Decadron 8mg Injection Reviewed

 

                    2019 12:00 AM   Depo-Medrol 80mg Injection Reviewed

 

                    2019 12:00 AM   THER/PROPH/DIAG INJ SC/IM Reviewed







Results Summary

Not available.



History Of Immunizations





      Name  Date Admin Mfg Name Mfg Code Trade Name Lot#  Route Inj   Vis Given 

Vis Pub 

CVX

 

        Influenza 10/4/2017 Not Entered NE      Not Entered         Not Entered 

Not Entered 

1                  158

 

          Influenza 2019  ID [employer] or [place] BCQ       Flulaval qu

adrivalent 3PM59     

Intramuscular   Right Deltoid   2019        158







History of Past Illness





                    Name                Date of Onset       Comments

 

                    Rosacea                                  

 

                    Chicken pox                              

 

                    Allergic rhinitis                        

 

                    Bunion of left foot                      

 

                    Bunion of right foot                      

 

                    Plantar fasciitis                        

 

                    Asthma                                   

 

                    Reactive hypoglycemia                      

 

                    Routine gynecological examination Dec 19 2012  9:18AM  

 

                                        Special investigations and examinations;

 pregnancy examination or test; 

pregnancy examination or test, negative result 2013 10:00AM        

 

                    IUD insertion       2013  9:52AM  

 

                    IUD Check/Removal/Management/Reinsertion Aug 28 2013  8:54AM

  

 

                    Flu vaccine need    2019  4:56PM  

 

                    Neck pain           2019  4:56PM  

 

                    Back pain           2019  4:56PM  

 

                    Knee pain           2019  4:56PM  

 

                    Bunion of great toe of left foot 2019  4:56PM  

 

                    Vitamin D deficiency 2019  4:56PM  

 

                    History of bariatric surgery 2019  4:56PM  

 

                    Bronchitis, Acute   2019  6:33PM  

 

                    Sinusitis, Acute    2019  6:33PM  

 

                    Cervicalgia         2019 12:25PM  

 

                    Other chronic pain  2019 12:25PM  







Payers





           Insurance Name Company Name Plan Name  Plan Number Policy Number [employer]

[employer] Group 

Number                                  Start Date

 

                    BCBS      [employer] [facility]           [id number]           N/

A

 

                    [facility]           [id number]           N/

A







History of Encounters





                    Visit Date          Visit Type          Provider

 

                    2019            Office visit        Moisés Walls NP

 

                    2019            Office visit        Amor Harper 

APRN

 

                    2013           Office visit        Pritesh Shoemaker MD

 

                    2013           Office visit        Pritesh Shoemaker MD

 

                    2012          Office visit        Pritesh Shoemaker MD

## 2020-01-03 NOTE — XMS REPORT
MU2 Ambulatory Summary

                             Created on: 2019



Elba Bradley

External Reference #: [id number]

: 1975

Sex: Female



Demographics





                          Address                   [address]

[place]  [address]

 

                          Home Phone                [phone]

 

                          Preferred Language        English

 

                          Marital Status            

 

                          Restorationism Affiliation     Unknown

 

                          Race                      Other Race

 

                          Ethnic Group              Not  or 





Author





                          Author                    Elba Walls

 

                          [facility] 

oup

 

                          Address                   [address]

[place], [place]  [address]



 

                          Phone                     [phone]







Care Team Providers





                    Care Team Member Name Role                Phone

 

                    Moisés Walls         PCP                 [phone]

 

                    Amor Harper PreferredProvider   [phone]







Allergies and Adverse Reactions





                    Name                Reaction            Notes

 

                      NO KNOWN DRUG ALLERGIES                      







Plan of Treatment





             Planned Activity Comments     Planned Date Planned Time Plan/Goal

 

             MRI CERVICAL SPINE W/O CONTRAST              2019    12:00 AM 

     

 

             Lumbar Spine 2-3 Views - Main              2019    12:00 AM   

   







Medications





                                        Active 

 

             Name         Start Date   Estimated Completion Date SIG          Co

mments

 

             Mucinex D  mg oral tablet extended release 12 hr             

                            

 

             Advil Cold and Sinus oral                                         

 

             albuterol sulfate inhalation                                       

  

 

                ipratropium bromide 0.02 % inhalation solution 2019         

               inhale 1.25 

milliliters (250 mcg) via nebulizer by inhalation route 3 times per day  

 

                diclofenac sodium 75 mg oral tablet,delayed release (DR/EC) 4/10

/2019       5/10/2019       

take 1 tablet (75 mg) by oral route 2 times per day for 30 days  







                                         

 

             Name         Start Date   Expiration Date SIG          Comments

 

                Mirena Intrauterine IUD 20 mcg/24 hour (5 years)                

                 place 1 device by 

intrauterine route                       

 

             doxycycline hyclate Oral tablet 20 mg                           kayleigh

e 1 tablet by oral route daily  

 

                levofloxacin 500 mg oral tablet 2019       

take 1 tablet (500 mg) by 

oral route once daily for 10 days        







                                        Discontinued 

 

             Name         Start Date   Discontinued Date SIG          Comments

 

                doxycycline hyclate Oral capsule 100 mg                 

3       take 1 capsule (100 mg) by 

oral route once daily                    







Problem List

Not available.



Vital Signs





     Date Time BP-Sys(mm[Hg] BP-Tami(mm[Hg]) HR(bpm) RR(rpm) Temp WT   HT   HC   

BMI  BSA  BMI

 Percentile                             O2 Sat(%)

 

       2019 6:28:00  mmHg 102 mmHg 101 bpm 24 rpm 98.2 F 181.5 lbs 67.

25 in  

                28.2157 kg/m  1.9764 m                      97 %

 

       2019 4:41:00  mmHg 72 mmHg 68 bpm 16 rpm 97.7 F 188.125 lbs 67.

25 in  

                29.25 kg/m2     2.01 m2                         98 %

 

      2013 8:46:00  mmHg 92 mmHg 72 bpm       97.6 F 263.25 lbs 67 in

       41.2304

 kg/m             2.3759 m                               

 

      2013 9:49:00  mmHg 98 mmHg 90 bpm       97 F  266.25 lbs 67 in 

      41.70 

kg/m2               2.39 m2                                  

 

      2012 9:16:00  mmHg 90 mmHg 66 bpm       97.2 F 264 lbs 67 in  

     41.3478 

kg/m              2.3792 m                               







Social History





                    Name                Description         Comments

 

                    Tobacco             Never smoker         

 

                    Alcohol             Never                







History of Procedures





                    Date Ordered        Description         Order Status

 

                    2012 12:00 AM CYTOPATH C/V MANUAL Reviewed

 

                    2012 12:00 AM SPECIMEN HANDLING OFFICE-LAB Reviewed

 

                    2012 12:00 AM CHLAMYDIA CULTURE   Reviewed

 

                    2012 12:00 AM N.GONORRHOEAE DNA AMP PROB Reviewed

 

                    2013 12:00 AM  URINE PREGNANCY TEST Reviewed

 

                    2013 12:00 AM  INSERT INTRAUTERINE DEVICE Reviewed

 

                    2013 12:00 AM  Mirena              Reviewed

 

                    2019 12:00 AM   FLU VAC NO PRSV 4 TAWANDA 3 YRS+ Reviewed

 

                    2019 12:00 AM   THER/PROPH/DIAG INJ SC/IM Reviewed

 

                    2019 12:00 AM   Decadron 8mg Injection Reviewed

 

                    2019 12:00 AM   Depo-Medrol 80mg Injection Reviewed

 

                    2019 12:00 AM   THER/PROPH/DIAG INJ SC/IM Reviewed







Results Summary

Not available.



History Of Immunizations





      Name  Date Admin Mfg Name Mfg Code Trade Name Lot#  Route Inj   Vis Given 

Vis Pub 

CVX

 

        Influenza 10/4/2017 Not Entered NE      Not Entered         Not Entered 

Not Entered 

1                  158

 

          Influenza 2019  ID [employer] [place] or [place] BCQ       Flulaval qu

adrivalent 3PM59     

Intramuscular   Right Deltoid   2019        158







History of Past Illness





                    Name                Date of Onset       Comments

 

                    Rosacea                                  

 

                    Chicken pox                              

 

                    Allergic rhinitis                        

 

                    Bunion of left foot                      

 

                    Bunion of right foot                      

 

                    Plantar fasciitis                        

 

                    Asthma                                   

 

                    Reactive hypoglycemia                      

 

                    Routine gynecological examination Dec 19 2012  9:18AM  

 

                                        Special investigations and examinations;

 pregnancy examination or test; 

pregnancy examination or test, negative result 2013 10:00AM        

 

                    IUD insertion       2013  9:52AM  

 

                    IUD Check/Removal/Management/Reinsertion Aug 28 2013  8:54AM

  

 

                    Flu vaccine need    2019  4:56PM  

 

                    Neck pain           2019  4:56PM  

 

                    Back pain           2019  4:56PM  

 

                    Knee pain           2019  4:56PM  

 

                    Bunion of great toe of left foot 2019  4:56PM  

 

                    Vitamin D deficiency 2019  4:56PM  

 

                    History of bariatric surgery 2019  4:56PM  

 

                    Bronchitis, Acute   2019  6:33PM  

 

                    Sinusitis, Acute    2019  6:33PM  

 

                    Cervicalgia         2019 12:25PM  

 

                    Other chronic pain  2019 12:25PM  

 

                    Dorsalgia, unspecified 2019 12:25PM  

 

                    Other chronic pain  2019 12:25PM  







Payers





           Insurance Name Company Name Plan Name  Plan Number Policy Number Slim

cy Group 

Number                                  Start Date

 

                    BCBS      [employer] [facility]           [id number]           N/

A

 

                    [employer]BS      [employer] [facility]           [id number]           N/

A







History of Encounters





                    Visit Date          Visit Type          Provider

 

                    2019            Office visit        Moisés Walls NP

 

                    2019            Office visit        Amor Harper 

APRN

 

                    2013           Office visit        Pritesh Shoemaker MD

 

                    2013           Office visit        Pritesh Shoemaker MD

 

                    2012          Office visit        Pritesh Shoemaker MD

## 2020-01-03 NOTE — XMS REPORT
MU2 Ambulatory Summary

                             Created on: 04/10/2019



Elba Bradley

External Reference #: [id number]

: 1975

Sex: Female



Demographics





                          Address                   [address]

[place]  [address]

 

                          Home Phone                [phone]

 

                          Preferred Language        English

 

                          Marital Status            

 

                          Quaker Affiliation     Unknown

 

                          Race                      Other Race

 

                          Ethnic Group              Not  or 





Author





                          Author                    Elba Walls

 

                          [facility] 

oup

 

                          Address                   [address]

[place], [place]  [address]



 

                          Phone                     [phone]







Care Team Providers





                    Care Team Member Name Role                Phone

 

                    Moisés Walls         PCP                 [phone]

 

                    Amor Harper PreferredProvider   [phone]







Allergies and Adverse Reactions





                    Name                Reaction            Notes

 

                      NO KNOWN DRUG ALLERGIES                      







Plan of Treatment

Not available.



Medications





                                        Active 

 

             Name         Start Date   Estimated Completion Date SIG          Co

mments

 

             Mucinex D  mg oral tablet extended release 12 hr             

                            

 

             Advil Cold and Sinus oral                                         

 

             albuterol sulfate inhalation                                       

  

 

                ipratropium bromide 0.02 % inhalation solution 2019         

               inhale 1.25 

milliliters (250 mcg) via nebulizer by inhalation route 3 times per day  

 

                diclofenac sodium 75 mg oral tablet,delayed release (DR/EC) 4/10

/2019       5/10/2019       

take 1 tablet (75 mg) by oral route 2 times per day for 30 days  







                                         

 

             Name         Start Date   Expiration Date SIG          Comments

 

                Mirena Intrauterine IUD 20 mcg/24 hour (5 years)                

                 place 1 device by 

intrauterine route                       

 

             doxycycline hyclate Oral tablet 20 mg                           kayleigh

e 1 tablet by oral route daily  

 

                levofloxacin 500 mg oral tablet 2019       

take 1 tablet (500 mg) by 

oral route once daily for 10 days        







                                        Discontinued 

 

             Name         Start Date   Discontinued Date SIG          Comments

 

                doxycycline hyclate Oral capsule 100 mg                 

3       take 1 capsule (100 mg) by 

oral route once daily                    







Problem List

Not available.



Vital Signs





     Date Time BP-Sys(mm[Hg] BP-Tami(mm[Hg]) HR(bpm) RR(rpm) Temp WT   HT   HC   

BMI  BSA  BMI

 Percentile                             O2 Sat(%)

 

       2019 6:28:00  mmHg 102 mmHg 101 bpm 24 rpm 98.2 F 181.5 lbs 67.

25 in  

                28.2157 kg/m  1.9764 m                      97 %

 

       2019 4:41:00  mmHg 72 mmHg 68 bpm 16 rpm 97.7 F 188.125 lbs 67.

25 in  

                29.25 kg/m2     2.01 m2                         98 %

 

      2013 8:46:00  mmHg 92 mmHg 72 bpm       97.6 F 263.25 lbs 67 in

       41.2304

 kg/m             2.3759 m                               

 

      2013 9:49:00  mmHg 98 mmHg 90 bpm       97 F  266.25 lbs 67 in 

      41.70 

kg/m2               2.39 m2                                  

 

      2012 9:16:00  mmHg 90 mmHg 66 bpm       97.2 F 264 lbs 67 in  

     41.3478 

kg/m              2.3792 m                               







Social History





                    Name                Description         Comments

 

                    Tobacco             Never smoker         

 

                    Alcohol             Never                







History of Procedures





                    Date Ordered        Description         Order Status

 

                    2012 12:00 AM CYTOPATH C/V MANUAL Reviewed

 

                    2012 12:00 AM SPECIMEN HANDLING OFFICE-LAB Reviewed

 

                    2012 12:00 AM CHLAMYDIA CULTURE   Reviewed

 

                    2012 12:00 AM N.GONORRHOEAE DNA AMP PROB Reviewed

 

                    2013 12:00 AM  URINE PREGNANCY TEST Reviewed

 

                    2013 12:00 AM  INSERT INTRAUTERINE DEVICE Reviewed

 

                    2013 12:00 AM  Mirena              Reviewed

 

                    2019 12:00 AM   FLU VAC NO PRSV 4 TAWANDA 3 YRS+ Reviewed

 

                    2019 12:00 AM   THER/PROPH/DIAG INJ SC/IM Reviewed

 

                    2019 12:00 AM   Decadron 8mg Injection Reviewed

 

                    2019 12:00 AM   Depo-Medrol 80mg Injection Reviewed

 

                    2019 12:00 AM   THER/PROPH/DIAG INJ SC/IM Reviewed







Results Summary

Not available.



History Of Immunizations





      Name  Date Admin Mfg Name Mfg Code Trade Name Lot#  Route Inj   Vis Given 

Vis Pub 

CVX

 

        Influenza 10/4/2017 Not Entered NE      Not Entered         Not Entered 

Not Entered 

1                  158

 

          Influenza 2019  ID [employer] Martir or [place] BCQ       Flulaval qu

adrivalent 3PM59     

Intramuscular   Right Deltoid   2019        158







History of Past Illness





                    Name                Date of Onset       Comments

 

                    Rosacea                                  

 

                    Chicken pox                              

 

                    Allergic rhinitis                        

 

                    Bunion of left foot                      

 

                    Bunion of right foot                      

 

                    Plantar fasciitis                        

 

                    Asthma                                   

 

                    Reactive hypoglycemia                      

 

                    Routine gynecological examination Dec 19 2012  9:18AM  

 

                                        Special investigations and examinations;

 pregnancy examination or test; 

pregnancy examination or test, negative result 2013 10:00AM        

 

                    IUD insertion       2013  9:52AM  

 

                    IUD Check/Removal/Management/Reinsertion Aug 28 2013  8:54AM

  

 

                    Flu vaccine need    2019  4:56PM  

 

                    Neck pain           2019  4:56PM  

 

                    Back pain           2019  4:56PM  

 

                    Knee pain           2019  4:56PM  

 

                    Bunion of great toe of left foot 2019  4:56PM  

 

                    Vitamin D deficiency 2019  4:56PM  

 

                    History of bariatric surgery 2019  4:56PM  

 

                    Bronchitis, Acute   2019  6:33PM  

 

                    Sinusitis, Acute    2019  6:33PM  







Payers





           Insurance Name Company Name Plan Name  Plan Number Policy Number Slim

cy Group 

Number                                  Start Date

 

                    BCBS      [employer] [facility]           [id number]           N/

A

 

                    [facility]           [id number]           N/

A







History of Encounters





                    Visit Date          Visit Type          Provider

 

                    2019            Office visit        Moisés Walls NP

 

                    2019            Office visit        Amor Harper 

APRN

 

                    2013           Office visit        Pritesh Shoemaker MD

 

                    2013           Office visit        Pritesh Shoemaker MD

 

                    2012          Office visit        Pritesh Shoemaker MD

## 2020-01-03 NOTE — XMS REPORT
MU2 Ambulatory Summary

                             Created on: 2019



Elba Bradley

External Reference #: [id number]

: 1975

Sex: Female



Demographics





                          Address                   [address]

[place]  [id number]

 

                          Home Phone                [phone]

 

                          Preferred Language        English

 

                          Marital Status            

 

                          Mandaen Affiliation     Unknown

 

                          Race                      Other Race

 

                          Ethnic Group              Not  or 





Author





                          Elba Malloy

 

                          [facility] 

oup

 

                          Address                   [address]

[place], [place]  [id number]



 

                          Phone                     [phone]







Care Team Providers





                    Care Team Member Name Role                Phone

 

                    Brenda Lora PCP                 [phone]

 

                    Amor Harper PreferredProvider   [phone]







Allergies and Adverse Reactions





                    Name                Reaction            Notes

 

                      NO KNOWN DRUG ALLERGIES                      







Plan of Treatment





             Planned Activity Comments     Planned Date Planned Time Plan/Goal

 

             Breast ultrasound              2019     12:00 AM      

 

             Unilateral Diagnostic Mammo with Tomosynthesis              

9     12:00 AM      







Medications





                                        Active 

 

             Name         Start Date   Estimated Completion Date SIG          Co

mments

 

             Mucinex D  mg oral tablet extended release 12 hr             

                            

 

             Advil Cold and Sinus oral                                         

 

             albuterol sulfate inhalation                                       

  

 

                ipratropium bromide 0.02 % inhalation solution 2019         

               inhale 1.25 

milliliters (250 mcg) via nebulizer by inhalation route 3 times per day  

 

                cholecalciferol (vitamin D3) 50,000 unit oral capsule 5/10/2019 

                      take 1 capsule

by oral route once  weekly               







                                         

 

             Name         Start Date   Expiration Date SIG          Comments

 

                Mirena Intrauterine IUD 20 mcg/24 hour (5 years)                

                 place 1 device by 

intrauterine route                       

 

             doxycycline hyclate Oral tablet 20 mg                           kayleigh

e 1 tablet by oral route daily  

 

                levofloxacin 500 mg oral tablet 2019       

take 1 tablet (500 mg) by 

oral route once daily for 10 days        

 

                diclofenac sodium 75 mg oral tablet,delayed release (DR/EC) 4/10

/2019       5/10/2019       

take 1 tablet (75 mg) by oral route 2 times per day for 30 days  







                                        Discontinued 

 

             Name         Start Date   Discontinued Date SIG          Comments

 

                doxycycline hyclate Oral capsule 100 mg                 

3       take 1 capsule (100 mg) by 

oral route once daily                    







Problem List

Not available.



Vital Signs





     Date Time BP-Sys(mm[Hg] BP-Tami(mm[Hg]) HR(bpm) RR(rpm) Temp WT   HT   HC   

BMI  BSA  BMI

 Percentile                             O2 Sat(%)

 

      2019 9:27:00  mmHg 80 mmHg 65 bpm       98.4 F 187 lbs 67.25 in

       29.0707

 kg/m             2.0062 m                               

 

       2019 6:28:00  mmHg 102 mmHg 101 bpm 24 rpm 98.2 F 181.5 lbs 67.

25 in  

                28.22 kg/m2     1.98 m2                         97 %

 

       2019 4:41:00  mmHg 72 mmHg 68 bpm 16 rpm 97.7 F 188.125 lbs 67.

25 in  

                29.25 kg/m2     2.01 m2                         98 %

 

      2013 8:46:00  mmHg 92 mmHg 72 bpm       97.6 F 263.25 lbs 67 in

       41.2304

 kg/m             2.3759 m                               

 

      2013 9:49:00  mmHg 98 mmHg 90 bpm       97 F  266.25 lbs 67 in 

      41.70 

kg/m2               2.39 m2                                  

 

      2012 9:16:00  mmHg 90 mmHg 66 bpm       97.2 F 264 lbs 67 in  

     41.3478 

kg/m              2.3792 m                               







Social History





                    Name                Description         Comments

 

                    Tobacco             Never smoker         

 

                    Alcohol             Never                







History of Procedures





                    Date Ordered        Description         Order Status

 

                    2012 12:00 AM CYTOPATH C/V MANUAL Reviewed

 

                    2012 12:00 AM SPECIMEN HANDLING OFFICE-LAB Reviewed

 

                    2012 12:00 AM CHLAMYDIA CULTURE   Reviewed

 

                    2012 12:00 AM N.GONORRHOEAE DNA AMP PROB Reviewed

 

                    2013 12:00 AM  URINE PREGNANCY TEST Reviewed

 

                    2013 12:00 AM  INSERT INTRAUTERINE DEVICE Reviewed

 

                    2013 12:00 AM  Mirena              Reviewed

 

                    2019 12:00 AM   FLU VAC NO PRSV 4 TAWANDA 3 YRS+ Reviewed

 

                    2019 12:00 AM   THER/PROPH/DIAG INJ SC/IM Reviewed

 

                    2019 12:00 AM   Decadron 8mg Injection Reviewed

 

                    2019 12:00 AM   Depo-Medrol 80mg Injection Reviewed

 

                    2019 12:00 AM   THER/PROPH/DIAG INJ SC/IM Reviewed

 

                    2019 12:00 AM  MRI NECK SPINE W/O DYE Reviewed

 

                    2019 12:00 AM  X-RAY EXAM L-S SPINE 2/3 VWS Reviewed

 

                    2019 12:00 AM   ROUTINE VENIPUNCTURE Reviewed

 

                    2019 12:00 AM   COMPREHEN METABOLIC PANEL Reviewed

 

                    2019 12:00 AM   GLYCOSYLATED HEMOGLOBIN TEST Reviewed

 

                    2019 12:00 AM   VITAMIN D 25 HYDROXY Reviewed

 

                    2019 12:00 AM   ASSAY THYROID STIM HORMONE Reviewed

 

                    2019 12:00 AM   LIPID PANEL         Reviewed

 

                    2019 12:00 AM  Screening mammography, bilateral Reviewe

d

 

                    2019 12:00 AM  MAMMOGRAM BOTH BREASTS Reviewed







Results Summary





                          Date and Description      Results

 

                          2019 8:25 AM          GLUCOSE 86 SODIUM 140 POTASS

IUM 4.3 CHLORIDE 108 CO2 26 BUN 13 

CREATININE 0.73 SGOT/AST 15 SGPT/ALT 8 ALK PHOS 52 TOTAL PROTEIN 6.1 ALBUMIN 4.0
 TOTAL BILI 0.8 CALCIUM 9.4 AGE 43 GFR NonAA 86 GFR  eGFR 86 eGFR AA* >60 
TRIGLYCERIDES 102 CHOLESTEROL 204 HDL 54 TOT CHOL/HDL 3.8 LDL (CALC) 130 VITAMIN
 D 17.7 TSH 0.98 HGB A1C 5.10 %Est Avg Glucose 99.7 







History Of Immunizations





      Name  Date Admin Mfg Name Mfg Code Trade Name Lot#  Route Inj   Vis Given 

Vis Pub 

CVX

 

        Influenza 10/4/2017 Not Entered NE      Not Entered         Not Entered 

Not Entered 

1                  158

 

          Influenza 2019  ID [employer] or [place] BCQ       Flulaval qu

adrivalent 3PM59     

Intramuscular   Right Deltoid   2019        158







History of Past Illness





                    Name                Date of Onset       Comments

 

                    Rosacea                                  

 

                    Chicken pox                              

 

                    Allergic rhinitis                        

 

                    Bunion of left foot                      

 

                    Bunion of right foot                      

 

                    Plantar fasciitis                        

 

                    Asthma                                   

 

                    Reactive hypoglycemia                      

 

                    Routine gynecological examination Dec 19 2012  9:18AM  

 

                                        Special investigations and examinations;

 pregnancy examination or test; 

pregnancy examination or test, negative result 2013 10:00AM        

 

                    IUD insertion       2013  9:52AM  

 

                    IUD Check/Removal/Management/Reinsertion Aug 28 2013  8:54AM

  

 

                    Flu vaccine need    2019  4:56PM  

 

                    Neck pain           2019  4:56PM  

 

                    Back pain           2019  4:56PM  

 

                    Knee pain           2019  4:56PM  

 

                    Bunion of great toe of left foot 2019  4:56PM  

 

                    Vitamin D deficiency 2019  4:56PM  

 

                    History of bariatric surgery 2019  4:56PM  

 

                    Bronchitis, Acute   2019  6:33PM  

 

                    Sinusitis, Acute    2019  6:33PM  

 

                    Cervicalgia         2019 12:25PM  

 

                    Other chronic pain  2019 12:25PM  

 

                    Dorsalgia, unspecified 2019 12:25PM  

 

                    Other chronic pain  2019 12:25PM  

 

                    Screening for ischemic heart disease May  8 2019 10:52AM  

 

                    Myalgia             May  8 2019 10:52AM  

 

                    Reactive hypoglycemia May  8 2019 10:52AM  

 

                    History of bariatric surgery May  8 2019 10:52AM  

 

                    Encounter for screening mammogram for breast cancer May 13 2

019  1:28PM  

 

                    Encntr for gyn exam (general) (routine) w/o abn findings May

 13 2019  9:33AM  

 

                          Special screening for malignant neoplasms; breast; oth

er screening mammogram May

 13 2019  9:33AM                         

 

                    Abnormal Mammogram  2019  4:15PM  







Payers





           Insurance Name Company Name Plan Name  Plan Number Policy Number Slim

cy Group 

Number                                  Start Date

 

                    BCBS      [employer] [place]           [id number]           N/

A

 

                    BCBS      [employer] [place]           [id number]           N/

A







History of Encounters





                    Visit Date          Visit Type          Provider

 

                    2019           Office visit         

 

                    2019           Office visit        Brenda melendrez APRN

 

                    2019            Laboratory          Amor Harper 

APRN

 

                    2019            Office visit        Moisés Walls NP

 

                    2019            Office visit        Amor Harper 

APRN

 

                    2013           Office visit        Pritesh Shoemaker MD

 

                    2013           Office visit        Pritesh Shoemaker MD

 

                    2012          Office visit        Pritesh Shoemaker MD

## 2020-01-03 NOTE — XMS REPORT
[facility]

                             Created on: 2015



Elba Bradley

External Reference #: [id number]

: 1975

Sex: Female



Demographics





                          Address                   [address]

[place]  [address]

 

                          Home Phone                [phone]

 

                          Preferred Language        Unknown

 

                          Marital Status            Unknown

 

                          Jewish Affiliation     Unknown

 

                          Race                      White

 

                          Ethnic Group              Not  or 





Author





                          Author                    Elba Diego

 

                          Organization              [employer]

 

                          Address                   Unknown

 

                          Phone                     Unavailable







Care Team Providers





                    Care Team Member Name Role                Phone

 

                    Soraida Diego                      Unavailable



                                                                



Allergies

          No Known Allergies                                                    
                                   



Problems

          



             Problem Type Condition    Code         Onset Dates  Condition Statu

s

 

             Problem      Hyperhidrosis, Primary Focal 705.21                   

 Active

 

             Problem      Back Pain, Low 724.2                     Active

 

             Problem      Vitamin D deficiency 268.9                     Active

 

             Problem      Conjunctivitis, rosacea 372.31                    Acti

ve

 

             Problem      Bunion of great toe of left foot 727.1                

     Active

 

             Problem      Allergic Rhinitis Unspecified 477.9                   

  Active

 

             Problem      Morbid obesity with BMI of 45.0-49.9, adult 278.01    

                Active

 

             Problem      Hypertriglyceridemia 272.1                     Active

 

             Problem      Rosacea      695.3                     Active



                                                                                
                                                                                
      



Medications

          No Known Medications                                                  
                           



Results

          No Known Results                                                      
             



Summary Purpose

          [employer] Submission

## 2020-01-03 NOTE — XMS REPORT
MU2 Ambulatory Summary

                             Created on: 2019



Elba Bradley

External Reference #: [id number]

: 1975

Sex: Female



Demographics





                          Address                   [address]

[place]  [id number]

 

                          Home Phone                [phone]

 

                          Preferred Language        English

 

                          Marital Status            

 

                          Scientology Affiliation     Unknown

 

                          Race                      Other Race

 

                          Ethnic Group              Not  or 





Author





                          Author                    Elba Lora

 

                          [facility] 

oup

 

                          Address                   [address]

[place], [place]  [id number]



 

                          Phone                     [phone]







Care Team Providers





                    Care Team Member Name Role                Phone

 

                    Brenda Lora PCP                 [phone]

 

                    Amor Harper PreferredProvider   [phone]







Allergies and Adverse Reactions





                    Name                Reaction            Notes

 

                      NO KNOWN DRUG ALLERGIES                      







Plan of Treatment

Not available.



Medications





                                        Active 

 

             Name         Start Date   Estimated Completion Date SIG          Co

mments

 

             Mucinex D  mg oral tablet extended release 12 hr             

                            

 

             Advil Cold and Sinus oral                                         

 

             albuterol sulfate inhalation                                       

  

 

                ipratropium bromide 0.02 % inhalation solution 2019         

               inhale 1.25 

milliliters (250 mcg) via nebulizer by inhalation route 3 times per day  

 

                cholecalciferol (vitamin D3) 50,000 unit oral capsule 5/10/2019 

                      take 1 capsule

by oral route once  weekly               







                                         

 

             Name         Start Date   Expiration Date SIG          Comments

 

                Mirena Intrauterine IUD 20 mcg/24 hour (5 years)                

                 place 1 device by 

intrauterine route                       

 

             doxycycline hyclate Oral tablet 20 mg                           kayleigh

e 1 tablet by oral route daily  

 

                levofloxacin 500 mg oral tablet 2019       

take 1 tablet (500 mg) by 

oral route once daily for 10 days        

 

                diclofenac sodium 75 mg oral tablet,delayed release (DR/EC) 4/10

/2019       5/10/2019       

take 1 tablet (75 mg) by oral route 2 times per day for 30 days  







                                        Discontinued 

 

             Name         Start Date   Discontinued Date SIG          Comments

 

                doxycycline hyclate Oral capsule 100 mg                 

3       take 1 capsule (100 mg) by 

oral route once daily                    







Problem List

Not available.



Vital Signs





     Date Time BP-Sys(mm[Hg] BP-Tami(mm[Hg]) HR(bpm) RR(rpm) Temp WT   HT   HC   

BMI  BSA  BMI

 Percentile                             O2 Sat(%)

 

      2019 9:27:00  mmHg 80 mmHg 65 bpm       98.4 F 187 lbs 67.25 in

       29.0707

 kg/m             2.0062 m                               

 

       2019 6:28:00  mmHg 102 mmHg 101 bpm 24 rpm 98.2 F 181.5 lbs 67.

25 in  

                28.22 kg/m2     1.98 m2                         97 %

 

       2019 4:41:00  mmHg 72 mmHg 68 bpm 16 rpm 97.7 F 188.125 lbs 67.

25 in  

                29.25 kg/m2     2.01 m2                         98 %

 

      2013 8:46:00  mmHg 92 mmHg 72 bpm       97.6 F 263.25 lbs 67 in

       41.2304

 kg/m             2.3759 m                               

 

      2013 9:49:00  mmHg 98 mmHg 90 bpm       97 F  266.25 lbs 67 in 

      41.70 

kg/m2               2.39 m2                                  

 

      2012 9:16:00  mmHg 90 mmHg 66 bpm       97.2 F 264 lbs 67 in  

     41.3478 

kg/m              2.3792 m                               







Social History





                    Name                Description         Comments

 

                    Tobacco             Never smoker         

 

                    Alcohol             Never                







History of Procedures





                    Date Ordered        Description         Order Status

 

                    2012 12:00 AM CYTOPATH C/V MANUAL Reviewed

 

                    2012 12:00 AM SPECIMEN HANDLING OFFICE-LAB Reviewed

 

                    2012 12:00 AM CHLAMYDIA CULTURE   Reviewed

 

                    2012 12:00 AM N.GONORRHOEAE DNA AMP PROB Reviewed

 

                    2013 12:00 AM  URINE PREGNANCY TEST Reviewed

 

                    2013 12:00 AM  INSERT INTRAUTERINE DEVICE Reviewed

 

                    2013 12:00 AM  Mirena              Reviewed

 

                    2019 12:00 AM   FLU VAC NO PRSV 4 TAWANDA 3 YRS+ Reviewed

 

                    2019 12:00 AM   THER/PROPH/DIAG INJ SC/IM Reviewed

 

                    2019 12:00 AM   Decadron 8mg Injection Reviewed

 

                    2019 12:00 AM   Depo-Medrol 80mg Injection Reviewed

 

                    2019 12:00 AM   THER/PROPH/DIAG INJ SC/IM Reviewed

 

                    2019 12:00 AM  MRI NECK SPINE W/O DYE Returned

 

                    2019 12:00 AM  X-RAY EXAM L-S SPINE 2/3 VWS Returned

 

                    2019 12:00 AM   ROUTINE VENIPUNCTURE Reviewed

 

                    2019 12:00 AM   COMPREHEN METABOLIC PANEL Returned

 

                    2019 12:00 AM   GLYCOSYLATED HEMOGLOBIN TEST Returned

 

                    2019 12:00 AM   VITAMIN D 25 HYDROXY Returned

 

                    2019 12:00 AM   ASSAY THYROID STIM HORMONE Returned

 

                    2019 12:00 AM   LIPID PANEL         Returned

 

                    2019 12:00 AM  MAMMOGRAM BOTH BREASTS Reviewed







Results Summary

Not available.



History Of Immunizations





      Name  Date Admin Mfg Name Mfg Code Trade Name Lot#  Route Inj   Vis Given 

Vis Pub 

CVX

 

        Influenza 10/4/2017 Not Entered NE      Not Entered         Not Entered 

Not Entered 

1                  158

 

          Influenza 2019  ID [employer] or [place] BCQ       Flulaval qu

adrivalent 3PM59     

Intramuscular   Right Deltoid   2019        158







History of Past Illness





                    Name                Date of Onset       Comments

 

                    Rosacea                                  

 

                    Chicken pox                              

 

                    Allergic rhinitis                        

 

                    Bunion of left foot                      

 

                    Bunion of right foot                      

 

                    Plantar fasciitis                        

 

                    Asthma                                   

 

                    Reactive hypoglycemia                      

 

                    Routine gynecological examination Dec 19 2012  9:18AM  

 

                                        Special investigations and examinations;

 pregnancy examination or test; 

pregnancy examination or test, negative result 2013 10:00AM        

 

                    IUD insertion       2013  9:52AM  

 

                    IUD Check/Removal/Management/Reinsertion Aug 28 2013  8:54AM

  

 

                    Flu vaccine need    2019  4:56PM  

 

                    Neck pain           2019  4:56PM  

 

                    Back pain           2019  4:56PM  

 

                    Knee pain           2019  4:56PM  

 

                    Bunion of great toe of left foot 2019  4:56PM  

 

                    Vitamin D deficiency 2019  4:56PM  

 

                    History of bariatric surgery 2019  4:56PM  

 

                    Bronchitis, Acute   2019  6:33PM  

 

                    Sinusitis, Acute    2019  6:33PM  

 

                    Cervicalgia         2019 12:25PM  

 

                    Other chronic pain  2019 12:25PM  

 

                    Dorsalgia, unspecified 2019 12:25PM  

 

                    Other chronic pain  2019 12:25PM  

 

                    Screening for ischemic heart disease May  8 2019 10:52AM  

 

                    Myalgia             May  8 2019 10:52AM  

 

                    Reactive hypoglycemia May  8 2019 10:52AM  

 

                    History of bariatric surgery May  8 2019 10:52AM  

 

                    Encounter for screening mammogram for breast cancer May 13 2

019  1:28PM  

 

                    Encntr for gyn exam (general) (routine) w/o abn findings May

 13 2019  9:33AM  

 

                          Special screening for malignant neoplasms; breast; oth

er screening mammogram May

 13 2019  9:33AM                         







Payers





           Insurance Name Company Name Plan Name  Plan Number Policy Number Slim

cy Group 

Number                                  Start Date

 

                    [employer]BS      [employer] [place]           [id number]           N/

A

 

                    [employer]BS      [employer] [place]           [id number]           N/

A







History of Encounters





                    Visit Date          Visit Type          Provider

 

                    2019           Office visit         

 

                    2019           Office visit        Brenda melendrez APRN

 

                    2019            Laboratory          Amor Harper 

APRN

 

                    2019            Office visit        Moisés Walls NP

 

                    2019            Office visit        Amor Harper 

APRN

 

                    2013           Office visit        Pritesh Shoemaker MD

 

                    2013           Office visit        Pritesh Shoemaker MD

 

                    2012          Office visit        Pritesh Shoemaker MD

## 2020-01-03 NOTE — XMS REPORT
MU2 Ambulatory Summary

                             Created on: 2019



Elba Bradley

External Reference #: [id number]

: 1975

Sex: Female



Demographics





                          Address                   [address]

[place]  [address]

 

                          Home Phone                [phone]

 

                          Preferred Language        English

 

                          Marital Status            

 

                          Confucianism Affiliation     Unknown

 

                          Race                      Other Race

 

                          Ethnic Group              Not  or 





Author





                          Author                    Elba Harper

 

                          [facility] 

oup

 

                          Address                   [address]

[place], [place]  [id number]



 

                          Phone                     [phone]







Care Team Providers





                    Care Team Member Name Role                Phone

 

                    Amor Harper PCP                 [phone]

 

                    Amor Harper PreferredProvider   [phone]







Allergies and Adverse Reactions





                    Name                Reaction            Notes

 

                      NO KNOWN DRUG ALLERGIES                      

 

                    Strawberry                               







Plan of Treatment





             Planned Activity Comments     Planned Date Planned Time Plan/Goal

 

             Breast ultrasound              2019     12:00 AM      







Medications





                                        Active 

 

             Name         Start Date   Estimated Completion Date SIG          Co

mments

 

             Mucinex D  mg oral tablet extended release 12 hr             

                            

 

             Advil Cold and Sinus oral                                         

 

             albuterol sulfate inhalation                                       

  

 

                ipratropium bromide 0.02 % inhalation solution 2019         

               inhale 1.25 

milliliters (250 mcg) via nebulizer by inhalation route 3 times per day  

 

             Vitamin D3 400 unit oral capsule                           take 1 c

apsule by oral route daily  

 

                dicyclomine 20 mg oral tablet 2019                       ta

ke 1 tablet (20 mg) by oral route 4 

times per day as needed                  







                                         

 

             Name         Start Date   Expiration Date SIG          Comments

 

                Mirena Intrauterine IUD 20 mcg/24 hour (5 years)                

                 place 1 device by 

intrauterine route                       

 

             doxycycline hyclate Oral tablet 20 mg                           kayleigh

e 1 tablet by oral route daily  

 

                levofloxacin 500 mg oral tablet 2019       

take 1 tablet (500 mg) by 

oral route once daily for 10 days        

 

                diclofenac sodium 75 mg oral tablet,delayed release (DR/EC) 4/10

/2019       5/10/2019       

take 1 tablet (75 mg) by oral route 2 times per day for 30 days  







                                        Discontinued 

 

             Name         Start Date   Discontinued Date SIG          Comments

 

                doxycycline hyclate Oral capsule 100 mg                 

3       take 1 capsule (100 mg) by 

oral route once daily                    

 

                cholecalciferol (vitamin D3) 50,000 unit oral capsule 5/10/2019 

      2019       take 1

capsule by oral route once  weekly       







Problem List

Not available.



Vital Signs





     Date Time BP-Sys(mm[Hg] BP-Tami(mm[Hg]) HR(bpm) RR(rpm) Temp WT   HT   HC   

BMI  BSA  BMI

 Percentile                             O2 Sat(%)

 

      2019 4:42:00  mmHg 84 mmHg 77 bpm 16 rpm 97.5 F 192 lbs 67 in  

     

30.0711 kg/m      2.029 m                               98 %

 

      2019 9:27:00  mmHg 80 mmHg 65 bpm       98.4 F 187 lbs 67.25 in

       29.07 

kg/m2               2.01 m2                                  

 

       2019 6:28:00  mmHg 102 mmHg 101 bpm 24 rpm 98.2 F 181.5 lbs 67.

25 in  

                28.2157 kg/m  1.9764 m                      97 %

 

       2019 4:41:00  mmHg 72 mmHg 68 bpm 16 rpm 97.7 F 188.125 lbs 67.

25 in  

                29.25 kg/m2     2.01 m2                         98 %

 

      2013 8:46:00  mmHg 92 mmHg 72 bpm       97.6 F 263.25 lbs 67 in

       41.2304

 kg/m             2.3759 m                               

 

      2013 9:49:00  mmHg 98 mmHg 90 bpm       97 F  266.25 lbs 67 in 

      41.70 

kg/m2               2.39 m2                                  

 

      2012 9:16:00  mmHg 90 mmHg 66 bpm       97.2 F 264 lbs 67 in  

     41.3478 

kg/m              2.3792 m                               







Social History





                    Name                Description         Comments

 

                    Tobacco             Never smoker         

 

                    Alcohol             Never                







History of Procedures





                    Date Ordered        Description         Order Status

 

                    2012 12:00 AM CYTOPATH C/V MANUAL Reviewed

 

                    2012 12:00 AM SPECIMEN HANDLING OFFICE-LAB Reviewed

 

                    2012 12:00 AM CHLAMYDIA CULTURE   Reviewed

 

                    2012 12:00 AM N.GONORRHOEAE DNA AMP PROB Reviewed

 

                    2013 12:00 AM  URINE PREGNANCY TEST Reviewed

 

                    2013 12:00 AM  INSERT INTRAUTERINE DEVICE Reviewed

 

                    2013 12:00 AM  Mirena              Reviewed

 

                    2019 12:00 AM   FLU VAC NO PRSV 4 TAWANDA 3 YRS+ Reviewed

 

                    2019 12:00 AM   THER/PROPH/DIAG INJ SC/IM Reviewed

 

                    2019 12:00 AM   Decadron 8mg Injection Reviewed

 

                    2019 12:00 AM   Depo-Medrol 80mg Injection Reviewed

 

                    2019 12:00 AM   THER/PROPH/DIAG INJ SC/IM Reviewed

 

                    2019 12:00 AM  MRI NECK SPINE W/O DYE Reviewed

 

                    2019 12:00 AM  X-RAY EXAM L-S SPINE 2/3 VWS Reviewed

 

                    2019 12:00 AM   ROUTINE VENIPUNCTURE Reviewed

 

                    2019 12:00 AM   COMPREHEN METABOLIC PANEL Reviewed

 

                    2019 12:00 AM   GLYCOSYLATED HEMOGLOBIN TEST Reviewed

 

                    2019 12:00 AM   VITAMIN D 25 HYDROXY Reviewed

 

                    2019 12:00 AM   ASSAY THYROID STIM HORMONE Reviewed

 

                    2019 12:00 AM   LIPID PANEL         Reviewed

 

                    2019 12:00 AM  Screening mammography, bilateral Reviewe

d

 

                    2019 12:00 AM  MAMMOGRAM BOTH BREASTS Reviewed

 

                    2019 12:00 AM   BREAST TOMOSYNTHESIS UNI Reviewed

 

                    2019 12:00 AM  ROUTINE VENIPUNCTURE Reviewed

 

                    2019 12:00 AM  VITAMIN D 25 HYDROXY Returned







Results Summary





                          Date and Description      Results

 

                          2019 8:25 AM          GLUCOSE 86 SODIUM 140 POTASS

IUM 4.3 CHLORIDE 108.0 mmol/LCO2 26

 BUN 13.0 mg/dLCREATININE 0.730 mg/dLSGOT/AST 15 SGPT/ALT 8 ALK PHOS 52 TOTAL 
PROTEIN 6.1 ALBUMIN 4.0 TOTAL BILI 0.8 CALCIUM 9.40 mg/dLAGE 43 GFR NonAA 86 GFR
  eGFR 86 eGFR AA* >60 mL/min/1.73 [id number] 102 CHOLESTEROL 204.0 
mg/dLHDL 54 TOT CHOL/HDL 3.8 LDL (CALC) 130 VITAMIN D 17.7 TSH 0.98 HGB A1C 5.10
 %Est Avg Glucose 99.7 







History Of Immunizations





      Name  Date Admin Mfg Name Mfg Code Trade Name Lot#  Route Inj   Vis Given 

Vis Pub 

CVX

 

        Influenza 10/4/2017 Not Entered NE      Not Entered         Not Entered 

Not Entered 

1                  158

 

          Influenza 2019  ID [employer] or [place] BCQ       Flulaval qu

adrivalent 3PM59     

Intramuscular   Right Deltoid   2019        158







History of Past Illness





                    Name                Date of Onset       Comments

 

                    Rosacea                                  

 

                    Chicken pox                              

 

                    Allergic rhinitis                        

 

                    Bunion of left foot                      

 

                    Bunion of right foot                      

 

                    Plantar fasciitis                        

 

                    Asthma                                   

 

                    Reactive hypoglycemia                      

 

                    Routine gynecological examination Dec 19 2012  9:18AM  

 

                                        Special investigations and examinations;

 pregnancy examination or test; 

pregnancy examination or test, negative result 2013 10:00AM        

 

                    IUD insertion       2013  9:52AM  

 

                    IUD Check/Removal/Management/Reinsertion Aug 28 2013  8:54AM

  

 

                    Flu vaccine need    2019  4:56PM  

 

                    Neck pain           2019  4:56PM  

 

                    Back pain           2019  4:56PM  

 

                    Knee pain           2019  4:56PM  

 

                    Bunion of great toe of left foot 2019  4:56PM  

 

                    Vitamin D deficiency 2019  4:56PM  

 

                    History of bariatric surgery 2019  4:56PM  

 

                    Bronchitis, Acute   2019  6:33PM  

 

                    Sinusitis, Acute    2019  6:33PM  

 

                    Cervicalgia         2019 12:25PM  

 

                    Other chronic pain  2019 12:25PM  

 

                    Dorsalgia, unspecified 2019 12:25PM  

 

                    Other chronic pain  2019 12:25PM  

 

                    Screening for ischemic heart disease May  8 2019 10:52AM  

 

                    Myalgia             May  8 2019 10:52AM  

 

                    Reactive hypoglycemia May  8 2019 10:52AM  

 

                    History of bariatric surgery May  8 2019 10:52AM  

 

                    Encounter for screening mammogram for breast cancer May 13 2

019  1:28PM  

 

                    Encntr for gyn exam (general) (routine) w/o abn findings May

 13 2019  9:33AM  

 

                          Special screening for malignant neoplasms; breast; oth

er screening mammogram May

 13 2019  9:33AM                         

 

                    Abnormal Mammogram  2019  4:15PM  

 

                    Vitamin D deficiency 2019 10:39AM  







Payers





           Insurance Name Company Name Plan Name  Plan Number Policy Number [facility] 

Number                                  Start Date

 

                    [employer]BS      [employer]           [id number]           N/

A

 

                    [employer]BS      [employer]           [id number]           N/

A







History of Encounters





                    Visit Date          Visit Type          Provider

 

                    2019           Office visit        Amor Harper 

APRN

 

                    2019           Laboratory          Amor Harper 

APRN

 

                    2019           Office visit         

 

                    2019           Office visit        Brenda melendrez APRN

 

                    2019            Laboratory          Amor Harper 

APRN

 

                    2019            Office visit        Moisés Walls NP

 

                    2019            Office visit        Amor Harper 

APRN

 

                    2013           Office visit        Pritesh Shoemaker MD

 

                    2013           Office visit        Pritesh Shoemaker MD

 

                    2012          Office visit        Pritesh Shoemaker MD

## 2020-01-03 NOTE — XMS REPORT
MU2 Ambulatory Summary

                             Created on: 2019



Elba Bradley

External Reference #: [id number]

: 1975

Sex: Female



Demographics





                          Address                   [address]

[place]  [address]

 

                          Home Phone                [phone]

 

                          Preferred Language        English

 

                          Marital Status            

 

                          Muslim Affiliation     Unknown

 

                          Race                      Other Race

 

                          Ethnic Group              Not  or 





Author





                          Author                    Elba Harper

 

                          [facility] 

[facility]

 

                          Address                   [address]

[place], [place]  [id number]



 

                          Phone                     [phone]







Care Team Providers





                    Care Team Member Name Role                Phone

 

                    Amor Harper PCP                 [phone]

 

                    Amor Harper PreferredProvider   [phone]







Allergies and Adverse Reactions





                    Name                Reaction            Notes

 

                      NO KNOWN DRUG ALLERGIES                      

 

                    Strawberry                               







Plan of Treatment





             Planned Activity Comments     Planned Date Planned Time Plan/Goal

 

             Breast ultrasound              2019     12:00 AM      







Medications





                                        Active 

 

             Name         Start Date   Estimated Completion Date SIG          Co

mments

 

             Mucinex D  mg oral tablet extended release 12 hr             

                            

 

             Advil Cold and Sinus oral                                         

 

             albuterol sulfate inhalation                                       

  

 

                ipratropium bromide 0.02 % inhalation solution 2019         

               inhale 1.25 

milliliters (250 mcg) via nebulizer by inhalation route 3 times per day  

 

             Vitamin D3 400 unit oral capsule                           take 1 c

apsule by oral route daily  

 

                dicyclomine 20 mg oral tablet 2019                       ta

ke 1 tablet (20 mg) by oral route 4 

times per day as needed                  







                                         

 

             Name         Start Date   Expiration Date SIG          Comments

 

                Mirena Intrauterine IUD 20 mcg/24 hour (5 years)                

                 place 1 device by 

intrauterine route                       

 

             doxycycline hyclate Oral tablet 20 mg                           kayleigh

e 1 tablet by oral route daily  

 

                levofloxacin 500 mg oral tablet 2019       

take 1 tablet (500 mg) by 

oral route once daily for 10 days        

 

                diclofenac sodium 75 mg oral tablet,delayed release (DR/EC) 4/10

/2019       5/10/2019       

take 1 tablet (75 mg) by oral route 2 times per day for 30 days  







                                        Discontinued 

 

             Name         Start Date   Discontinued Date SIG          Comments

 

                doxycycline hyclate Oral capsule 100 mg                 

3       take 1 capsule (100 mg) by 

oral route once daily                    

 

                cholecalciferol (vitamin D3) 50,000 unit oral capsule 5/10/2019 

      2019       take 1

capsule by oral route once  weekly       







Problem List

Not available.



Vital Signs





     Date Time BP-Sys(mm[Hg] BP-Tami(mm[Hg]) HR(bpm) RR(rpm) Temp WT   HT   HC   

BMI  BSA  BMI

 Percentile                             O2 Sat(%)

 

      2019 4:42:00  mmHg 84 mmHg 77 bpm 16 rpm 97.5 F 192 lbs 67 in  

     

30.0711 kg/m      2.029 m                               98 %

 

      2019 9:27:00  mmHg 80 mmHg 65 bpm       98.4 F 187 lbs 67.25 in

       29.07 

kg/m2               2.01 m2                                  

 

       2019 6:28:00  mmHg 102 mmHg 101 bpm 24 rpm 98.2 F 181.5 lbs 67.

25 in  

                28.2157 kg/m  1.9764 m                      97 %

 

       2019 4:41:00  mmHg 72 mmHg 68 bpm 16 rpm 97.7 F 188.125 lbs 67.

25 in  

                29.25 kg/m2     2.01 m2                         98 %

 

      2013 8:46:00  mmHg 92 mmHg 72 bpm       97.6 F 263.25 lbs 67 in

       41.2304

 kg/m             2.3759 m                               

 

      2013 9:49:00  mmHg 98 mmHg 90 bpm       97 F  266.25 lbs 67 in 

      41.70 

kg/m2               2.39 m2                                  

 

      2012 9:16:00  mmHg 90 mmHg 66 bpm       97.2 F 264 lbs 67 in  

     41.3478 

kg/m              2.3792 m                               







Social History





                    Name                Description         Comments

 

                    Tobacco             Never smoker         

 

                    Alcohol             Never                







History of Procedures





                    Date Ordered        Description         Order Status

 

                    2012 12:00 AM CYTOPATH C/V MANUAL Reviewed

 

                    2012 12:00 AM SPECIMEN HANDLING OFFICE-LAB Reviewed

 

                    2012 12:00 AM CHLAMYDIA CULTURE   Reviewed

 

                    2012 12:00 AM N.GONORRHOEAE DNA AMP PROB Reviewed

 

                    2013 12:00 AM  URINE PREGNANCY TEST Reviewed

 

                    2013 12:00 AM  INSERT INTRAUTERINE DEVICE Reviewed

 

                    2013 12:00 AM  Mirena              Reviewed

 

                    2019 12:00 AM   FLU VAC NO PRSV 4 TAWANDA 3 YRS+ Reviewed

 

                    2019 12:00 AM   THER/PROPH/DIAG INJ SC/IM Reviewed

 

                    2019 12:00 AM   Decadron 8mg Injection Reviewed

 

                    2019 12:00 AM   Depo-Medrol 80mg Injection Reviewed

 

                    2019 12:00 AM   THER/PROPH/DIAG INJ SC/IM Reviewed

 

                    2019 12:00 AM  MRI NECK SPINE W/O DYE Reviewed

 

                    2019 12:00 AM  X-RAY EXAM L-S SPINE 2/3 VWS Reviewed

 

                    2019 12:00 AM   ROUTINE VENIPUNCTURE Reviewed

 

                    2019 12:00 AM   COMPREHEN METABOLIC PANEL Reviewed

 

                    2019 12:00 AM   GLYCOSYLATED HEMOGLOBIN TEST Reviewed

 

                    2019 12:00 AM   VITAMIN D 25 HYDROXY Reviewed

 

                    2019 12:00 AM   ASSAY THYROID STIM HORMONE Reviewed

 

                    2019 12:00 AM   LIPID PANEL         Reviewed

 

                    2019 12:00 AM  Screening mammography, bilateral Reviewe

d

 

                    2019 12:00 AM  MAMMOGRAM BOTH BREASTS Reviewed

 

                    2019 12:00 AM   BREAST TOMOSYNTHESIS UNI Reviewed

 

                    2019 12:00 AM  ROUTINE VENIPUNCTURE Reviewed

 

                    2019 12:00 AM  VITAMIN D 25 HYDROXY Returned







Results Summary





                          Date and Description      Results

 

                          2019 8:25 AM          GLUCOSE 86 SODIUM 140 POTASS

IUM 4.3 CHLORIDE 108.0 mmol/LCO2 26

 BUN 13.0 mg/dLCREATININE 0.730 mg/dLSGOT/AST 15 SGPT/ALT 8 ALK PHOS 52 TOTAL 
PROTEIN 6.1 ALBUMIN 4.0 TOTAL BILI 0.8 CALCIUM 9.40 mg/dLAGE 43 GFR NonAA 86 GFR
  eGFR 86 eGFR AA* >60 mL/min/1.73 [id number] 102 CHOLESTEROL 204.0 
mg/dLHDL 54 TOT CHOL/HDL 3.8 LDL (CALC) 130 VITAMIN D 17.7 TSH 0.98 HGB A1C 5.10
 %Est Avg Glucose 99.7 







History Of Immunizations





      Name  Date Admin Mfg Name Mfg Code Trade Name Lot#  Route Inj   Vis Given 

Vis Pub 

CVX

 

        Influenza 10/4/2017 Not Entered NE      Not Entered         Not Entered 

Not Entered 

1                  158

 

          Influenza 2019  ID [employer] or [place] BCQ       Flulaval qu

adrivalent 3PM59     

Intramuscular   Right Deltoid   2019        158







History of Past Illness





                    Name                Date of Onset       Comments

 

                    Rosacea                                  

 

                    Chicken pox                              

 

                    Allergic rhinitis                        

 

                    Bunion of left foot                      

 

                    Bunion of right foot                      

 

                    Plantar fasciitis                        

 

                    Asthma                                   

 

                    Reactive hypoglycemia                      

 

                    Routine gynecological examination Dec 19 2012  9:18AM  

 

                                        Special investigations and examinations;

 pregnancy examination or test; 

pregnancy examination or test, negative result 2013 10:00AM        

 

                    IUD insertion       2013  9:52AM  

 

                    IUD Check/Removal/Management/Reinsertion Aug 28 2013  8:54AM

  

 

                    Flu vaccine need    2019  4:56PM  

 

                    Neck pain           2019  4:56PM  

 

                    Back pain           2019  4:56PM  

 

                    Knee pain           2019  4:56PM  

 

                    Bunion of great toe of left foot 2019  4:56PM  

 

                    Vitamin D deficiency 2019  4:56PM  

 

                    History of bariatric surgery 2019  4:56PM  

 

                    Bronchitis, Acute   2019  6:33PM  

 

                    Sinusitis, Acute    2019  6:33PM  

 

                    Cervicalgia         2019 12:25PM  

 

                    Other chronic pain  2019 12:25PM  

 

                    Dorsalgia, unspecified 2019 12:25PM  

 

                    Other chronic pain  2019 12:25PM  

 

                    Screening for ischemic heart disease May  8 2019 10:52AM  

 

                    Myalgia             May  8 2019 10:52AM  

 

                    Reactive hypoglycemia May  8 2019 10:52AM  

 

                    History of bariatric surgery May  8 2019 10:52AM  

 

                    Encounter for screening mammogram for breast cancer May 13 2

019  1:28PM  

 

                    Encntr for gyn exam (general) (routine) w/o abn findings May

 13 2019  9:33AM  

 

                          Special screening for malignant neoplasms; breast; oth

er screening mammogram May

 13 2019  9:33AM                         

 

                    Abnormal Mammogram  2019  4:15PM  

 

                    Vitamin D deficiency 2019 10:39AM  

 

                    Right epiphora      2019  4:42PM  







Payers





           Insurance Name Company Name Plan Name  Plan Number Policy Number [facility] Group 

Number                                  Start Date

 

                    [employer]      [employer] [facility]           [id number]           N/

A

 

                    [employer]      [employer] [facility]           [id number]           N/

A







History of Encounters





                    Visit Date          Visit Type          Provider

 

                    2019           Office visit        Amor Harper 

APRN

 

                    2019           Laboratory          Amor Harper 

APRN

 

                    2019           Office visit         

 

                    2019           Office visit        Brenda melendrez APRN

 

                    2019            Laboratory          Amor Harepr 

APRN

 

                    2019            Office visit        Moisés Walls NP

 

                    2019            Office visit        Amor DIAZ

 

                    2013           Office visit        Pritesh Shoemaker MD

 

                    2013           Office visit        Pritesh Shoemaker MD

 

                    2012          Office visit        Pritesh Shoemaker MD

## 2020-01-03 NOTE — XMS REPORT
MU2 Ambulatory Summary

                             Created on: 2019



Elba Bradley

External Reference #: [id number]

: 1975

Sex: Female



Demographics





                          Address                   [address]

[place]  [id number]

 

                          Home Phone                [phone]

 

                          Preferred Language        English

 

                          Marital Status            

 

                          Lutheran Affiliation     Unknown

 

                          Race                      Other Race

 

                          Ethnic Group              Not  or 





Author





                          Author                    Elba Lora

 

                          [facility] 

oup

 

                          Address                   [address]

[place], [place]  [address]



 

                          Phone                     [phone]







Care Team Providers





                    Care Team Member Name Role                Phone

 

                    Brenda Lora PCP                 [phone]

 

                    Amor Harper PreferredProvider   [phone]







Allergies and Adverse Reactions





                    Name                Reaction            Notes

 

                      NO KNOWN DRUG ALLERGIES                      







Plan of Treatment





             Planned Activity Comments     Planned Date Planned Time Plan/Goal

 

             Pap smear                 2019    9:56 AM       







Medications





                                        Active 

 

             Name         Start Date   Estimated Completion Date SIG          Co

mments

 

             Mucinex D  mg oral tablet extended release 12 hr             

                            

 

             Advil Cold and Sinus oral                                         

 

             albuterol sulfate inhalation                                       

  

 

                ipratropium bromide 0.02 % inhalation solution 2019         

               inhale 1.25 

milliliters (250 mcg) via nebulizer by inhalation route 3 times per day  

 

                cholecalciferol (vitamin D3) 50,000 unit oral capsule 5/10/2019 

                      take 1 capsule

by oral route once  weekly               







                                         

 

             Name         Start Date   Expiration Date SIG          Comments

 

                Mirena Intrauterine IUD 20 mcg/24 hour (5 years)                

                 place 1 device by 

intrauterine route                       

 

             doxycycline hyclate Oral tablet 20 mg                           kayleigh

e 1 tablet by oral route daily  

 

                levofloxacin 500 mg oral tablet 2019       

take 1 tablet (500 mg) by 

oral route once daily for 10 days        

 

                diclofenac sodium 75 mg oral tablet,delayed release (DR/EC) 4/10

/2019       5/10/2019       

take 1 tablet (75 mg) by oral route 2 times per day for 30 days  







                                        Discontinued 

 

             Name         Start Date   Discontinued Date SIG          Comments

 

                doxycycline hyclate Oral capsule 100 mg                 

3       take 1 capsule (100 mg) by 

oral route once daily                    







Problem List

Not available.



Vital Signs





     Date Time BP-Sys(mm[Hg] BP-Tami(mm[Hg]) HR(bpm) RR(rpm) Temp WT   HT   HC   

BMI  BSA  BMI

 Percentile                             O2 Sat(%)

 

      2019 9:27:00  mmHg 80 mmHg 65 bpm       98.4 F 187 lbs 67.25 in

       29.0707

 kg/m             2.0062 m                               

 

       2019 6:28:00  mmHg 102 mmHg 101 bpm 24 rpm 98.2 F 181.5 lbs 67.

25 in  

                28.22 kg/m2     1.98 m2                         97 %

 

       2019 4:41:00  mmHg 72 mmHg 68 bpm 16 rpm 97.7 F 188.125 lbs 67.

25 in  

                29.25 kg/m2     2.01 m2                         98 %

 

      2013 8:46:00  mmHg 92 mmHg 72 bpm       97.6 F 263.25 lbs 67 in

       41.2304

 kg/m             2.3759 m                               

 

      2013 9:49:00  mmHg 98 mmHg 90 bpm       97 F  266.25 lbs 67 in 

      41.70 

kg/m2               2.39 m2                                  

 

      2012 9:16:00  mmHg 90 mmHg 66 bpm       97.2 F 264 lbs 67 in  

     41.3478 

kg/m              2.3792 m                               







Social History





                    Name                Description         Comments

 

                    Tobacco             Never smoker         

 

                    Alcohol             Never                







History of Procedures





                    Date Ordered        Description         Order Status

 

                    2012 12:00 AM CYTOPATH C/V MANUAL Reviewed

 

                    2012 12:00 AM SPECIMEN HANDLING OFFICE-LAB Reviewed

 

                    2012 12:00 AM CHLAMYDIA CULTURE   Reviewed

 

                    2012 12:00 AM N.GONORRHOEAE DNA AMP PROB Reviewed

 

                    2013 12:00 AM  URINE PREGNANCY TEST Reviewed

 

                    2013 12:00 AM  INSERT INTRAUTERINE DEVICE Reviewed

 

                    2013 12:00 AM  Mirena              Reviewed

 

                    2019 12:00 AM   FLU VAC NO PRSV 4 TAWANDA 3 YRS+ Reviewed

 

                    2019 12:00 AM   THER/PROPH/DIAG INJ SC/IM Reviewed

 

                    2019 12:00 AM   Decadron 8mg Injection Reviewed

 

                    2019 12:00 AM   Depo-Medrol 80mg Injection Reviewed

 

                    2019 12:00 AM   THER/PROPH/DIAG INJ SC/IM Reviewed

 

                    2019 12:00 AM  MRI NECK SPINE W/O DYE Returned

 

                    2019 12:00 AM  X-RAY EXAM L-S SPINE 2/3 VWS Returned

 

                    2019 12:00 AM   ROUTINE VENIPUNCTURE Reviewed

 

                    2019 12:00 AM   COMPREHEN METABOLIC PANEL Returned

 

                    2019 12:00 AM   GLYCOSYLATED HEMOGLOBIN TEST Returned

 

                    2019 12:00 AM   VITAMIN D 25 HYDROXY Returned

 

                    2019 12:00 AM   ASSAY THYROID STIM HORMONE Returned

 

                    2019 12:00 AM   LIPID PANEL         Returned







Results Summary

Not available.



History Of Immunizations





      Name  Date Admin Mfg Name Mfg Code Trade Name Lot#  Route Inj   Vis Given 

Vis Pub 

CVX

 

        Influenza 10/4/2017 Not Entered NE      Not Entered         Not Entered 

Not Entered 

1                  158

 

          Influenza 2019  ID [employer] or [place] BCQ       Flulaval qu

adrivalent 3PM59     

Intramuscular   Right Deltoid   2019        158







History of Past Illness





                    Name                Date of Onset       Comments

 

                    Rosacea                                  

 

                    Chicken pox                              

 

                    Allergic rhinitis                        

 

                    Bunion of left foot                      

 

                    Bunion of right foot                      

 

                    Plantar fasciitis                        

 

                    Asthma                                   

 

                    Reactive hypoglycemia                      

 

                    Routine gynecological examination Dec 19 2012  9:18AM  

 

                                        Special investigations and examinations;

 pregnancy examination or test; 

pregnancy examination or test, negative result 2013 10:00AM        

 

                    IUD insertion       2013  9:52AM  

 

                    IUD Check/Removal/Management/Reinsertion Aug 28 2013  8:54AM

  

 

                    Flu vaccine need    2019  4:56PM  

 

                    Neck pain           2019  4:56PM  

 

                    Back pain           2019  4:56PM  

 

                    Knee pain           2019  4:56PM  

 

                    Bunion of great toe of left foot 2019  4:56PM  

 

                    Vitamin D deficiency 2019  4:56PM  

 

                    History of bariatric surgery 2019  4:56PM  

 

                    Bronchitis, Acute   2019  6:33PM  

 

                    Sinusitis, Acute    2019  6:33PM  

 

                    Cervicalgia         2019 12:25PM  

 

                    Other chronic pain  2019 12:25PM  

 

                    Dorsalgia, unspecified 2019 12:25PM  

 

                    Other chronic pain  2019 12:25PM  

 

                    Screening for ischemic heart disease May  8 2019 10:52AM  

 

                    Myalgia             May  8 2019 10:52AM  

 

                    Reactive hypoglycemia May  8 2019 10:52AM  

 

                    History of bariatric surgery May  8 2019 10:52AM  

 

                    Encounter for screening mammogram for breast cancer May 13 2

019  1:28PM  







Payers





           Insurance Name Company Name Plan Name  Plan Number Policy Number Slim

cy Group 

Number                                  Start Date

 

                    BCBS      [facility]           [id number]           N/

A

 

                    BCBS      [facility]           [id number]           N/

A







History of Encounters





                    Visit Date          Visit Type          Provider

 

                    2019           Office visit        Brenda melendrez APRN

 

                    2019            Laboratory          Amor Harper 

APRN

 

                    2019            Office visit        Moisés Walls NP

 

                    2019            Office visit        Amor Harper 

APRN

 

                    2013           Office visit        Pritesh Shoemaker MD

 

                    2013           Office visit        Pritesh Shoemaker MD

 

                    2012          Office visit        Pritesh Shoemaker MD

## 2020-01-03 NOTE — XMS REPORT
[facility]

                             Created on: 10/26/2015



Elba Bradley

External Reference #: [id number]

: 1975

Sex: Female



Demographics





                          Address                   [address]

[place]  [address]

 

                          Home Phone                [phone]

 

                          Preferred Language        Unknown

 

                          Marital Status            Unknown

 

                          Mormon Affiliation     Unknown

 

                          Race                      White

 

                          Ethnic Group              Not  or 





Author





                          Elba Anderson

 

                          Organization              [employer]

 

                          Address                   Unknown

 

                          Phone                     Unavailable







Care Team Providers





                    Care Team Member Name Role                Phone

 

                    DELBERT PLASENCIA   CP                  Unavailable



                                                                



Allergies

          No Known Allergies                                                    
                                   



Problems

          



             Problem Type Condition    Code         Onset Dates  Condition Statu

s

 

             Assessment   Observation of other suspected mental condition Z03.89

                    Active



                                                                                
       



Medications

          No Known Medications                                                  
                                     



Procedures

          



                Procedure       Coding System   Code            Date

 

                Psych diagnostic evaluation, new patient CPT-4           19037  

         Oct 22, 2015



                                                                              



Results

          No Known Results                                                      
             



Summary Purpose

          [employer] Submission

## 2020-01-03 NOTE — XMS REPORT
MU2 Ambulatory Summary

                             Created on: 2019



Elba Bradley

External Reference #: [id number]

: 1975

Sex: Female



Demographics





                          Address                   [address]

[place]  [address]

 

                          Home Phone                [phone]

 

                          Preferred Language        English

 

                          Marital Status            

 

                          Evangelical Affiliation     Unknown

 

                          Race                      Other Race

 

                          Ethnic Group              Not  or 





Author





                          Author                    Elba Walls

 

                          [facility] 

oup

 

                          Address                   [address]

[place], [place]  [address]



 

                          Phone                     [phone]







Care Team Providers





                    Care Team Member Name Role                Phone

 

                    Moisés Walls         PCP                 [phone]

 

                    Amor Harper PreferredProvider   [phone]







Allergies and Adverse Reactions





                    Name                Reaction            Notes

 

                      NO KNOWN DRUG ALLERGIES                      







Plan of Treatment





             Planned Activity Comments     Planned Date Planned Time Plan/Goal

 

             MRI CERVICAL SPINE W/O CONTRAST              2019    12:00 AM 

     







Medications





                                        Active 

 

             Name         Start Date   Estimated Completion Date SIG          Co

mments

 

             Mucinex D  mg oral tablet extended release 12 hr             

                            

 

             Advil Cold and Sinus oral                                         

 

             albuterol sulfate inhalation                                       

  

 

                ipratropium bromide 0.02 % inhalation solution 2019         

               inhale 1.25 

milliliters (250 mcg) via nebulizer by inhalation route 3 times per day  

 

                diclofenac sodium 75 mg oral tablet,delayed release (DR/EC) 4/10

/2019       5/10/2019       

take 1 tablet (75 mg) by oral route 2 times per day for 30 days  







                                         

 

             Name         Start Date   Expiration Date SIG          Comments

 

                Mirena Intrauterine IUD 20 mcg/24 hour (5 years)                

                 place 1 device by 

intrauterine route                       

 

             doxycycline hyclate Oral tablet 20 mg                           kayleigh

e 1 tablet by oral route daily  

 

                levofloxacin 500 mg oral tablet 2019       

take 1 tablet (500 mg) by 

oral route once daily for 10 days        







                                        Discontinued 

 

             Name         Start Date   Discontinued Date SIG          Comments

 

                doxycycline hyclate Oral capsule 100 mg                 

3       take 1 capsule (100 mg) by 

oral route once daily                    







Problem List

Not available.



Vital Signs





     Date Time BP-Sys(mm[Hg] BP-Tami(mm[Hg]) HR(bpm) RR(rpm) Temp WT   HT   HC   

BMI  BSA  BMI

 Percentile                             O2 Sat(%)

 

       2019 6:28:00  mmHg 102 mmHg 101 bpm 24 rpm 98.2 F 181.5 lbs 67.

25 in  

                28.2157 kg/m  1.9764 m                      97 %

 

       2019 4:41:00  mmHg 72 mmHg 68 bpm 16 rpm 97.7 F 188.125 lbs 67.

25 in  

                29.25 kg/m2     2.01 m2                         98 %

 

      2013 8:46:00  mmHg 92 mmHg 72 bpm       97.6 F 263.25 lbs 67 in

       41.2304

 kg/m             2.3759 m                               

 

      2013 9:49:00  mmHg 98 mmHg 90 bpm       97 F  266.25 lbs 67 in 

      41.70 

kg/m2               2.39 m2                                  

 

      2012 9:16:00  mmHg 90 mmHg 66 bpm       97.2 F 264 lbs 67 in  

     41.3478 

kg/m              2.3792 m                               







Social History





                    Name                Description         Comments

 

                    Tobacco             Never smoker         

 

                    Alcohol             Never                







History of Procedures





                    Date Ordered        Description         Order Status

 

                    2012 12:00 AM CYTOPATH C/V MANUAL Reviewed

 

                    2012 12:00 AM SPECIMEN HANDLING OFFICE-LAB Reviewed

 

                    2012 12:00 AM CHLAMYDIA CULTURE   Reviewed

 

                    2012 12:00 AM N.GONORRHOEAE DNA AMP PROB Reviewed

 

                    2013 12:00 AM  URINE PREGNANCY TEST Reviewed

 

                    2013 12:00 AM  INSERT INTRAUTERINE DEVICE Reviewed

 

                    2013 12:00 AM  Mirena              Reviewed

 

                    2019 12:00 AM   FLU VAC NO PRSV 4 TAWANDA 3 YRS+ Reviewed

 

                    2019 12:00 AM   THER/PROPH/DIAG INJ SC/IM Reviewed

 

                    2019 12:00 AM   Decadron 8mg Injection Reviewed

 

                    2019 12:00 AM   Depo-Medrol 80mg Injection Reviewed

 

                    2019 12:00 AM   THER/PROPH/DIAG INJ SC/IM Reviewed







Results Summary

Not available.



History Of Immunizations





      Name  Date Admin Mfg Name Mfg Code Trade Name Lot#  Route Inj   Vis Given 

Vis Pub 

CVX

 

        Influenza 10/4/2017 Not Entered NE      Not Entered         Not Entered 

Not Entered 

1                  158

 

          Influenza 2019  ID [employer] or [place] BCQ       Flulaval qu

adrivalent 3PM59     

Intramuscular   Right Deltoid   2019        158







History of Past Illness





                    Name                Date of Onset       Comments

 

                    Rosacea                                  

 

                    Chicken pox                              

 

                    Allergic rhinitis                        

 

                    Bunion of left foot                      

 

                    Bunion of right foot                      

 

                    Plantar fasciitis                        

 

                    Asthma                                   

 

                    Reactive hypoglycemia                      

 

                    Routine gynecological examination Dec 19 2012  9:18AM  

 

                                        Special investigations and examinations;

 pregnancy examination or test; 

pregnancy examination or test, negative result 2013 10:00AM        

 

                    IUD insertion       2013  9:52AM  

 

                    IUD Check/Removal/Management/Reinsertion Aug 28 2013  8:54AM

  

 

                    Flu vaccine need    2019  4:56PM  

 

                    Neck pain           2019  4:56PM  

 

                    Back pain           2019  4:56PM  

 

                    Knee pain           2019  4:56PM  

 

                    Bunion of great toe of left foot 2019  4:56PM  

 

                    Vitamin D deficiency 2019  4:56PM  

 

                    History of bariatric surgery 2019  4:56PM  

 

                    Bronchitis, Acute   2019  6:33PM  

 

                    Sinusitis, Acute    2019  6:33PM  

 

                    Cervicalgia         2019 12:25PM  

 

                    Other chronic pain  2019 12:25PM  







Payers





           Insurance Name Company Name Plan Name  Plan Number Policy Number Slim

cy Group 

Number                                  Start Date

 

                    BCBS      [facility]           [id number]           N/

A

 

                    BCBS      [facility]           [id number]           N/

A







History of Encounters





                    Visit Date          Visit Type          Provider

 

                    2019            Office visit        Moisés Walls NP

 

                    2019            Office visit        Amor Harper 

APRN

 

                    2013           Office visit        Pirtesh Shoemaker MD

 

                    2013           Office visit        Pritesh Shoemaker MD

 

                    2012          Office visit        Pritesh Shoemaker MD